# Patient Record
Sex: MALE | Race: WHITE | NOT HISPANIC OR LATINO | URBAN - METROPOLITAN AREA
[De-identification: names, ages, dates, MRNs, and addresses within clinical notes are randomized per-mention and may not be internally consistent; named-entity substitution may affect disease eponyms.]

---

## 2023-02-27 ENCOUNTER — INPATIENT (INPATIENT)
Facility: HOSPITAL | Age: 53
LOS: 0 days | Discharge: HOME | End: 2023-02-28
Attending: HOSPITALIST | Admitting: HOSPITALIST
Payer: COMMERCIAL

## 2023-02-27 VITALS
RESPIRATION RATE: 16 BRPM | SYSTOLIC BLOOD PRESSURE: 161 MMHG | OXYGEN SATURATION: 100 % | TEMPERATURE: 98 F | HEART RATE: 74 BPM | DIASTOLIC BLOOD PRESSURE: 90 MMHG

## 2023-02-27 DIAGNOSIS — R27.0 ATAXIA, UNSPECIFIED: ICD-10-CM

## 2023-02-27 LAB
ALBUMIN SERPL ELPH-MCNC: 4.7 G/DL — SIGNIFICANT CHANGE UP (ref 3.3–5)
ALP SERPL-CCNC: 74 U/L — SIGNIFICANT CHANGE UP (ref 40–120)
ALT FLD-CCNC: 19 U/L — SIGNIFICANT CHANGE UP (ref 4–41)
ANION GAP SERPL CALC-SCNC: 9 MMOL/L — SIGNIFICANT CHANGE UP (ref 7–14)
APPEARANCE UR: CLEAR — SIGNIFICANT CHANGE UP
APTT BLD: 27.1 SEC — SIGNIFICANT CHANGE UP (ref 27–36.3)
AST SERPL-CCNC: 17 U/L — SIGNIFICANT CHANGE UP (ref 4–40)
BILIRUB SERPL-MCNC: 0.2 MG/DL — SIGNIFICANT CHANGE UP (ref 0.2–1.2)
BILIRUB UR-MCNC: NEGATIVE — SIGNIFICANT CHANGE UP
BUN SERPL-MCNC: 18 MG/DL — SIGNIFICANT CHANGE UP (ref 7–23)
CALCIUM SERPL-MCNC: 9.3 MG/DL — SIGNIFICANT CHANGE UP (ref 8.4–10.5)
CARBAMAZEPINE SERPL-MCNC: 16.8 UG/ML — CRITICAL HIGH (ref 4–12)
CHLORIDE SERPL-SCNC: 101 MMOL/L — SIGNIFICANT CHANGE UP (ref 98–107)
CO2 SERPL-SCNC: 26 MMOL/L — SIGNIFICANT CHANGE UP (ref 22–31)
COLOR SPEC: SIGNIFICANT CHANGE UP
CREAT SERPL-MCNC: 0.61 MG/DL — SIGNIFICANT CHANGE UP (ref 0.5–1.3)
DIFF PNL FLD: NEGATIVE — SIGNIFICANT CHANGE UP
EGFR: 116 ML/MIN/1.73M2 — SIGNIFICANT CHANGE UP
FLUAV AG NPH QL: SIGNIFICANT CHANGE UP
FLUBV AG NPH QL: SIGNIFICANT CHANGE UP
GLUCOSE SERPL-MCNC: 89 MG/DL — SIGNIFICANT CHANGE UP (ref 70–99)
GLUCOSE UR QL: NEGATIVE — SIGNIFICANT CHANGE UP
HCT VFR BLD CALC: 44 % — SIGNIFICANT CHANGE UP (ref 39–50)
HGB BLD-MCNC: 14.4 G/DL — SIGNIFICANT CHANGE UP (ref 13–17)
INR BLD: 1.12 RATIO — SIGNIFICANT CHANGE UP (ref 0.88–1.16)
KETONES UR-MCNC: NEGATIVE — SIGNIFICANT CHANGE UP
LEUKOCYTE ESTERASE UR-ACNC: NEGATIVE — SIGNIFICANT CHANGE UP
MAGNESIUM SERPL-MCNC: 2.2 MG/DL — SIGNIFICANT CHANGE UP (ref 1.6–2.6)
MCHC RBC-ENTMCNC: 28.6 PG — SIGNIFICANT CHANGE UP (ref 27–34)
MCHC RBC-ENTMCNC: 32.7 GM/DL — SIGNIFICANT CHANGE UP (ref 32–36)
MCV RBC AUTO: 87.5 FL — SIGNIFICANT CHANGE UP (ref 80–100)
NITRITE UR-MCNC: NEGATIVE — SIGNIFICANT CHANGE UP
NRBC # BLD: 0 /100 WBCS — SIGNIFICANT CHANGE UP (ref 0–0)
NRBC # FLD: 0 K/UL — SIGNIFICANT CHANGE UP (ref 0–0)
PH UR: 6 — SIGNIFICANT CHANGE UP (ref 5–8)
PHOSPHATE SERPL-MCNC: 2.7 MG/DL — SIGNIFICANT CHANGE UP (ref 2.5–4.5)
PLATELET # BLD AUTO: 190 K/UL — SIGNIFICANT CHANGE UP (ref 150–400)
POTASSIUM SERPL-MCNC: 4.5 MMOL/L — SIGNIFICANT CHANGE UP (ref 3.5–5.3)
POTASSIUM SERPL-SCNC: 4.5 MMOL/L — SIGNIFICANT CHANGE UP (ref 3.5–5.3)
PROT SERPL-MCNC: 7.9 G/DL — SIGNIFICANT CHANGE UP (ref 6–8.3)
PROT UR-MCNC: ABNORMAL
PROTHROM AB SERPL-ACNC: 13 SEC — SIGNIFICANT CHANGE UP (ref 10.5–13.4)
RBC # BLD: 5.03 M/UL — SIGNIFICANT CHANGE UP (ref 4.2–5.8)
RBC # FLD: 13.4 % — SIGNIFICANT CHANGE UP (ref 10.3–14.5)
RSV RNA NPH QL NAA+NON-PROBE: SIGNIFICANT CHANGE UP
SARS-COV-2 RNA SPEC QL NAA+PROBE: SIGNIFICANT CHANGE UP
SODIUM SERPL-SCNC: 136 MMOL/L — SIGNIFICANT CHANGE UP (ref 135–145)
SP GR SPEC: 1.02 — SIGNIFICANT CHANGE UP (ref 1.01–1.05)
UROBILINOGEN FLD QL: SIGNIFICANT CHANGE UP
WBC # BLD: 5.3 K/UL — SIGNIFICANT CHANGE UP (ref 3.8–10.5)
WBC # FLD AUTO: 5.3 K/UL — SIGNIFICANT CHANGE UP (ref 3.8–10.5)

## 2023-02-27 PROCEDURE — 70496 CT ANGIOGRAPHY HEAD: CPT | Mod: 26,MA

## 2023-02-27 PROCEDURE — 70498 CT ANGIOGRAPHY NECK: CPT | Mod: 26,MA

## 2023-02-27 PROCEDURE — 99285 EMERGENCY DEPT VISIT HI MDM: CPT

## 2023-02-27 PROCEDURE — 71046 X-RAY EXAM CHEST 2 VIEWS: CPT | Mod: 26

## 2023-02-27 RX ORDER — LEVETIRACETAM 250 MG/1
1500 TABLET, FILM COATED ORAL ONCE
Refills: 0 | Status: DISCONTINUED | OUTPATIENT
Start: 2023-02-27 | End: 2023-02-28

## 2023-02-27 NOTE — ED PROVIDER NOTE - PROGRESS NOTE DETAILS
[facility] PGY1 - signout - 52-year-old male with a history of epilepsy, hypertension presents with hypotension, weakness, syncope, multiple falls over the last week.  Concern for elevated carbamazepine levels causing symptoms.  Carbamazepine level 16.8 here.  Pending neuro recommendations likely admission to hospitalist.

## 2023-02-27 NOTE — ED ADULT TRIAGE NOTE - CHIEF COMPLAINT QUOTE
Pt. states he had episodes of low blood pressure, weakness and fall last week. His BP meds were adjusted by MD. Today pt states he had more weakness, elevated BP and fall again. Pmhx: epilepsy, HTN

## 2023-02-27 NOTE — ED PROVIDER NOTE - ATTENDING CONTRIBUTION TO CARE
I (Gilson) agree with above, I performed a history and physical. Counseled richie medical staff, physician assistant, and/or medical student on medical decision making as documented. Medical decisions and treatment interventions were made in real time during the patient encounter. Additionally and/or with the following exceptions: Patient is a 52-year-old male past medical history of hypertension and epilepsy who is presenting to the emergency department with worsening falls and weakness for 2 weeks.  He fell earlier today at work did not hit his head.  Vital signs are noted to be normal except for hypertension, patient was initially ambulatory but had ataxia and nearly fell into the exam room.  He had 5 out of 5 strength in all extremities.  CBC was within normal limits, coagulation studies normal, CMP within normal limits, UA negative for UTI, carbamazepine level elevated to 16.8.  CT angiography was also performed to rule out central etiology.  CT angiography of the head and neck was negative for acute stenosis.  Suspect carbamazepine toxicity.  Especially in light that patient may or may not have taken his medication incorrectly or may have taken an extra dose.  Neurology was consulted for recs on dose adjustment.  Patient was amenable to admission until resolution of ataxia.

## 2023-02-27 NOTE — ED ADULT NURSE NOTE - OBJECTIVE STATEMENT
pt. received to room 24 A&Ox4 ambulatory pmh of epilepsy p/w multiple complaints. pt. endorses x2 weeks of "high and low blood pressure" highest being 160/100 at home, pt. is unsure of what lowest was. pt. endorsing blurry vision, loss of balance in the LE, and dizziness at this time. pt. endorses a feeling of urgency when urinating, denies GI changes. Pupils PERRLA at this time, no neurological deficits noted. NAD noted at this time. respirations even and unlabored on RA. NSR on cardiac monitor. MD Chamberlain at bedside for eval. comfort measures provided. safety precautions maintained.

## 2023-02-27 NOTE — PROVIDER CONTACT NOTE (OTHER) - BACKGROUND
[facility]orative Specialist Mobility Note       Activity: Ambulate in caballero, Chair     Assistive Device: Front wheel walker patient admitted for ataxia

## 2023-02-27 NOTE — PATIENT PROFILE ADULT - FALL HARM RISK - RISK INTERVENTIONS
Communicate Fall Risk and Risk Factors to all staff, patient, and family/Reinforce activity limits and safety measures with patient and family/Sit up slowly, dangle for a short time, stand at bedside before walking/Visual Cue: Yellow wristband/Bed in lowest position, wheels locked, appropriate side rails in place/Call bell, personal items and telephone in reach/Instruct patient to call for assistance before getting out of bed or chair/Non-slip footwear when patient is out of bed/[place] to call system/Physically safe environment - no spills, clutter or unnecessary equipment/Purposeful Proactive Rounding/Room/bathroom lighting operational, light cord in reach

## 2023-02-27 NOTE — CONSULT NOTE ADULT - ATTENDING COMMENTS
Re-evaluation this AM.  He has no dizziness now - feels back to normal.  No symptoms now.     Exam:  Gait: Normal heel toe, tandem.     Carbamazepine Level, Serum: 10.2 ug/mL (02.28.23 @ 07:03)   Carbamazepine Level, Serum: 16.8:  (02.27.23 @ 16:40)     Spoke with family at bedside along with outpatient Neurologist Dr. Randolph Bucio. Patient usually averages CBZ levels between 7-9 with sometimes a high of 11.     A/P  Mr. Matute is a 52 o man with a h/o epilepsy now with carbamazepine toxicity.  Now asymptomatic with normal levels.    Restart carbamazepine at 200mg q6 at 12PM   No neurological contraindication for discharge.  Follow up with Dr. Randolph Bucio, his outpatient Neurologist    D/W patient, family and Dr. Bucio.   Thank you Re-evaluation this AM.  He has no dizziness now - feels back to normal.  No symptoms now.     Exam:  No ataxia - FNF, TERRIE, HKS.   Gait: Normal heel toe, tandem.     Carbamazepine Level, Serum: 10.2 ug/mL (02.28.23 @ 07:03)   Carbamazepine Level, Serum: 16.8:  (02.27.23 @ 16:40)     Spoke with family at bedside along with outpatient Neurologist Dr. Randolph Bucio. Patient usually averages CBZ levels between 7-9 with sometimes a high of 11.     A/P  Mr. Matute is a 52 o man with a h/o epilepsy now with carbamazepine toxicity.  Now asymptomatic with normal levels.    Restart carbamazepine at 200mg q6 at 12PM   No neurological contraindication for discharge.  Follow up with Dr. Randolph Bucio, his outpatient Neurologist    D/W patient, family and Dr. Bucio.   Thank you

## 2023-02-27 NOTE — CONSULT NOTE ADULT - ASSESSMENT
***  VS on presentation notable for /90. Exam without localizing neurological deficits. CBC, CMP wnl. UA (-). Carbamazepine level 16.8. CTH w/o and CTA H/N w/ unremarkable.       Recommendations:   [] Cardiac markers, CK, lactate  [] Recheck carbamazepine level   [] EKG, Orthostatic vital signs   [] Rest of syncope workup as per primary team  [] Per discussion with ED provider, patient's PM carbamazepine will be held tonight  [] Would call patient's neurologist, Dr. Randolph Bucio, for further recommendations regarding carbamazepine dosing  [] Neurochecks/VS per unit protocol  [] Fall precautions  [] Outpatient follow-up with his neurologist, Dr. Randolph Bucio   ***  VS on presentation notable for /90. Exam without localizing neurological deficits. CBC, CMP wnl. UA (-). Carbamazepine level 16.8. CTH w/o and CTA H/N w/ unremarkable.       Recommendations:   [] Cardiac markers, CK, lactate  [] Recheck carbamazepine level   [] EKG, Orthostatic vital signs   [] Rest of syncope workup as per primary team  [] Per patient's neurologist, Dr. Randolph Bucio, patient's PM carbamazepine will be held tonight  [] Further recommendations regarding carbamazepine dosing per patient's neurologist  [] Neurochecks/VS per unit protocol  [] Fall precautions  [] Outpatient follow-up with his neurologist, Dr. Randolph Bucio   ***  VS on presentation notable for /90. Exam without localizing neurological deficits. CBC, CMP wnl. UA (-). Carbamazepine level 16.8. CTH w/o and CTA H/N w/ unremarkable.       Recommendations:   [] Cardiac markers, CK, lactate  [] Discussed with patient's neurologist, Dr. Randolph Bucio: hold PM carbamazepine tonight  [] Recheck carbamazepine level in the morning  [] Depending on level, will consider carbamazepine 200mg in AM (half the usual dose), but will confirm this with primary team after labs result and discussion with patient's neurologist  [] Continue home keppra (1000mg qAM and 1500mg qPM)  [] EKG, Orthostatic vital signs   [] Neurochecks/VS per unit protocol  [] Fall precautions  [] Outpatient follow-up with his neurologist, Dr. Randolph Bucio   Mr. Matute is a 52 year-old right-handed man with a PMH of HTN and epilepsy (on carbamazepine 400mg BID and levetiracetam 1000mg qAM/1500mg qPM) who presented to the ED from home on 2/27/23 with c/o dizziness described as lightheadedness and falls without loss of consciousness, for which Neurology was consulted. VS on presentation notable for /90, however patient reported he did not take his BP medications prior to presentation. Exam without localizing neurological deficits. CBC, CMP wnl. UA (-). Carbamazepine level 16.8. CTH w/o and CTA H/N w/ unremarkable.     Impression: Transient dizziness/lightheadedness likely due to diffuse cerebral dysfunction possibly secondary to carbamazepine toxicity vs presyncope due to underlying cardiac/metabolic etiology.      Recommendations:   [] Cardiac markers, CK, lactate  [] Discussed with patient's neurologist, Dr. Randolph Bucio: hold PM carbamazepine tonight  [] Recheck carbamazepine level in the morning  [] Depending on level, will consider carbamazepine 200mg in AM (half the usual dose), but will confirm this with primary team after labs result and discussion with patient's neurologist  [] Continue home keppra (1000mg qAM and 1500mg qPM)  [] EKG, Orthostatic vital signs   [] Neurochecks/VS per unit protocol  [] Fall precautions  [] Outpatient follow-up with his neurologist, Dr. Randolph Bucio for further ASM titration      Discussed with patient's neurologist, Dr. Randolph Bucio.  To be formally staffed with Neurology attending, Dr. Bell, in AM.

## 2023-02-27 NOTE — ED PROVIDER NOTE - NS ED ROS FT
CONSTITUTIONAL: + weakness, no fevers or chills  EYES/ENT: + blurry vision  NECK: No pain or stiffness  RESPIRATORY: No cough, wheezing, or shortness of breath  CARDIOVASCULAR: No chest pain or palpitations  GASTROINTESTINAL: No abdominal pain, nausea, vomiting, diarrhea or constipation  GENITOURINARY: No dysuria, + frequency   NEUROLOGICAL: No numbness, + headache, dizziness  MUSCULOSKELETAL: No joint pain, no swelling, no back pain  SKIN: No itching, rashes

## 2023-02-27 NOTE — ED PROVIDER NOTE - OBJECTIVE STATEMENT
52m pmh of epilepsy and htn presenting to the ED for weakness and falls x 2 weeks. Pt endorses low blood pressure at home with 3 falls (no syncope) last week. States last week his PCP recommended taking half his amlodipine every other day, however, pt misunderstood and cut dose in half daily. Notes that today he felt more weak, had high /100, experienced dizziness with blurry vision and fell while at work today without hitting his head or lose consciousness. Also reports difficulty ambulating starting 3 days ago and endorses a feeling of urgency when urinating. Denies N/V/D, fever, sob, numbness, tingling, chest pain, or back pain. 52m pmh of epilepsy and htn presenting to the ED for weakness and falls x 2 weeks. Pt endorses low blood pressure at home with 3 falls (no syncope) last week. States last week his PCP recommended taking half his amlodipine every other day, however, pt misunderstood and cut dose in half daily. Notes that today he felt more weak, had high /100, experienced dizziness with blurry vision and fell while at work today without hitting his head or lose consciousness. Also reports difficulty ambulating starting 3 days ago and endorses a feeling of urgency when urinating. Denies N/V/D, fever, sob, numbness, tingling, chest pain, or back pain.    PCP: Randolph Bucio

## 2023-02-27 NOTE — ED ADULT NURSE NOTE - NSIMPLEMENTINTERV_GEN_ALL_ED
Implemented All Fall Risk Interventions:  [place] to call system. Call bell, personal items and telephone within reach. Instruct patient to call for assistance. Room bathroom lighting operational. Non-slip footwear when patient is off stretcher. Physically safe environment: no spills, clutter or unnecessary equipment. Stretcher in lowest position, wheels locked, appropriate side rails in place. Provide visual cue, wrist band, yellow gown, etc. Monitor gait and stability. Monitor for mental status changes and reorient to person, place, and time. Review medications for side effects contributing to fall risk. Reinforce activity limits and safety measures with patient and family.

## 2023-02-27 NOTE — ED PROVIDER NOTE - CLINICAL SUMMARY MEDICAL DECISION MAKING FREE TEXT BOX
52m pmh of epilepsy and htn presenting for weakness and falls after PCP adjusted amlodipine dose. Pt with /100, dizziness, blurry vision and fall today without LOC. Denies N/V/D, fever, sob, numbness, tingling, chest pain, or back pain. Will order labs and reassess. 52m pmh of epilepsy and htn presenting for weakness and falls after PCP adjusted amlodipine dose. Pt with /100, dizziness, blurry vision and fall today without LOC. Denies N/V/D, fever, sob, numbness, tingling, chest pain, or back pain. Will order labs and reassess.      ===================================  attending mdm (Valdez Riggins MD; attending emergency medicine and medical toxicology)    Patient is a 52-year-old male past medical history of hypertension and epilepsy who is presenting to the emergency department with worsening falls and weakness for 2 weeks.  He fell earlier today at work did not hit his head.  Vital signs are noted to be normal except for hypertension, patient was initially ambulatory but had ataxia and nearly fell into the exam room.  He had 5 out of 5 strength in all extremities.  CBC was within normal limits, coagulation studies normal, CMP within normal limits, UA negative for UTI, carbamazepine level elevated to 16.8.  CT angiography was also performed to rule out central etiology.  CT angiography of the head and neck was negative for acute stenosis.  Suspect carbamazepine toxicity.  Especially in light that patient may or may not have taken his medication incorrectly or may have taken an extra dose.  Neurology was consulted for recs on dose adjustment.  Patient was amenable to admission until resolution of ataxia.

## 2023-02-27 NOTE — ED PROVIDER NOTE - PHYSICAL EXAMINATION
CONSTITUTIONAL: NAD  HEAD: normocephalic, atraumatic  EYES: PERRLA, conjunctiva and sclera clear  ENMT: Moist oral mucosa  RESPIRATORY: Normal respiratory effort; lungs are clear to auscultation bilaterally  CARDIOVASCULAR: Regular rate and rhythm, normal S1 and S2, no lower extremity edema  ABDOMEN: Nontender to palpation, positive bowel sounds, no rebound/guarding  MUSCULOSKELETAL: No joint swelling or tenderness to palpation, no cyanosis or edema  NEUROLOGY: Alert, oriented, CN 2-12 intact and symmetric  SKIN: No rashes, warm, dry

## 2023-02-27 NOTE — CONSULT NOTE ADULT - SUBJECTIVE AND OBJECTIVE BOX
Neurology Consult    SANTOS GRANT  52y Male  MRN-[id number]      HPI:  ***      A 10-system ROS was performed and is negative except as noted above and/or in the HPI.      PAST MEDICAL & SURGICAL HISTORY:  Hypertension      No significant past surgical history      FAMILY HISTORY:  FH: hypertension        SOCIAL HISTORY:   Occupation:         MEDICATIONS    Home Medications:  Amlodipine  Lisinopril  Levetiracetam  Carbamazepine    MEDICATIONS  (STANDING):    MEDICATIONS  (PRN):      Allergies  No Known Allergies        VITALS & EXAMINATION    Vital Signs Last 24 Hrs  T(C): 37.2 (2023 20:19), Max: 37.2 (2023 20:19)  T(F): 98.9 (2023 20:19), Max: 98.9 (2023 20:19)  HR: 66 (2023 20:19) (65 - 74)  BP: 165/104 (2023 20:19) (154/97 - 165/104)  RR: 16 (2023 20:19) (16 - 16)  SpO2: 100% (2023 20:19) (100% - 100%)    Parameters below as of 2023 20:19  Patient On (Oxygen Delivery Method): room air    General:  Constitutional: M, appears stated age, in no apparent distress including pain.  Head: Normocephalic & atraumatic.  Respiratory: Breathing comfortably on room air.    Neurological (>12):  MS: Eyes open, alert, oriented to person, place, situation, time. Normal affect. Follows all commands.    Speech/Language: Clear, fluent with good repetition. Naming intact.    CNs: PERRL. CVF intact OU. EOMI, no nystagmus, no diplopia. V1-3 intact to LT, well developed masseter muscles b/l. No facial asymmetry b/l, full eye closure strength b/l. Hearing grossly intact to conversation. Symmetric palate elevation in midline, no uvula deviation. Gag reflex deferred. Head turning intact b/l. Tongue midline on protrusion.    Motor: Muscle bulk and tone are normal. There is no pronator drift.    RUE/LUE: 5/5 shoulder abductors, elbow flexors/extensors, wrist flexors/extensors, finger .      RLE/LLE: 5/5 hip flexors, knee flexors/extensors, ankle dorsiflexors and plantarflexors.     Sensation: Intact to LT b/l throughout. Normal Romberg.     Coordination: Fast finger tapping with normal amplitude and speed. No dysmetria to FTN/HTS b/l.    Gait: No postural instability. Normal base and natural gait. Able to tandem gait.         LABS:    CBC                       14.4   5.30  )-----------( 190      ( 2023 16:33 )             44.0     Chem     136  |  101  |  18  ----------------------------<  89  4.5   |  26  |  0.61    Ca    9.3      2023 16:33  Phos  2.7       Mg     2.20         TPro  7.9  /  Alb  4.7  /  TBili  0.2  /  DBili  x   /  AST  17  /  ALT  19  /  AlkPhos  74      Coags PT/INR - ( 2023 16:40 )   PT: 13.0 sec;   INR: 1.12 ratio         PTT - ( 2023 16:40 )  PTT:27.1 sec  LFTs LIVER FUNCTIONS - ( 2023 16:33 )  Alb: 4.7 g/dL / Pro: 7.9 g/dL / ALK PHOS: 74 U/L / ALT: 19 U/L / AST: 17 U/L / GGT: x           Lipid Panel   A1c   Cardiac enzymes     U/A Urinalysis Basic - ( 2023 18:53 )  Color: Light Yellow / Appearance: Clear / S.022 / pH: x  Gluc: x / Ketone: Negative  / Bili: Negative / Urobili: <2 mg/dL   Blood: x / Protein: Trace / Nitrite: Negative   Leuk Esterase: Negative / RBC: x / WBC x   Sq Epi: x / Non Sq Epi: x / Bacteria: x        STUDIES & IMAGING    CT Head w/o, CT Angio Head/Neck w/ IV Cont (23 @ 18:50)    CT brain: No acute intracranial hemorrhage, mass effect, midline shift.    CTA neck and brain: No vessel occlusion or hemodynamically significant   stenosis.    Postcontrast CT brain: No areas of abnormal enhancement.     Neurology Consult    SANTOS MATUTE  52y Male  MRN-[id number]      HPI:  Mr. Matute is a 52 year-old right-handed man with a PMH of HTN and epilepsy (on carbamazepine 400mg BID and levetiracetam 1000mg qAM/1500mg qPM) who presented to the ED from home on 23 with c/o dizziness and falls, for which Neurology was consulted. Patient reports a fall without loss of consciousness on Monday, 1 week prior to presentation. At that time his SBP was noted to be 90, which is much lower than baseline. He reports feeling "lightheaded" and states he lowered himself to the floor. There was no head trauma. He states that at that time, it was determined that he was taking his amlodipine incorrectly. He had recently been instructed to take alternating days of one 2.5mg tab and half a 2.5mg tab, but may have misunderstood and was only taking half a tab daily. He reports another episode over the weekend, during which it is unclear if his SBP was low. At that time, he discussed with his Neurologist, and bloodwork was sent, including a currently pending carbamazepine level. The reason being that, on Friday, the patient noted his carbamazepine was changed to a tab similar in color to his Keppra, and is unsure if he accidentally took more of the carbamazepine as a result. On the day of presentation, patient states he was standing at work at approximately 08:30 when he suddenly felt lightheaded and faint. States he did not fall or lose consciousness, but alerted his colleagues so he could step away and sit down. BP at the time was reported to be elevated at 160/90. However he admits he did not take any of his antihypertensives this AM. Furthermore he had 3 cups of coffee in a 4 hour period (4am, 6am, 8am) during which he normally only has 2 cups (4am and 8am). He reports blurry vision at the time, but no associated headache, diplopia, dysarthria, tinnitus numbness, tingling chest pain, sob or palpitations. No recent fever, chills or illness. He reports recent intentional 30lb weight loss over the course of 8 weeks through dietary changes.         A 10-system ROS was performed and is negative except as noted above and/or in the HPI.      PAST MEDICAL & SURGICAL HISTORY:  Hypertension      No significant past surgical history      FAMILY HISTORY:  FH: hypertension        SOCIAL HISTORY:   Occupation:         MEDICATIONS    Home Medications:  Amlodipine  Lisinopril  Levetiracetam  Carbamazepine    MEDICATIONS  (STANDING):    MEDICATIONS  (PRN):      Allergies  No Known Allergies        VITALS & EXAMINATION    Vital Signs Last 24 Hrs  T(C): 37.2 (2023 20:19), Max: 37.2 (2023 20:19)  T(F): 98.9 (2023 20:19), Max: 98.9 (2023 20:19)  HR: 66 (2023 20:19) (65 - 74)  BP: 165/104 (2023 20:19) (154/97 - 165/104)  RR: 16 (2023 20:19) (16 - 16)  SpO2: 100% (2023 20:19) (100% - 100%)    Parameters below as of 2023 20:19  Patient On (Oxygen Delivery Method): room air    General:  Constitutional: M, appears stated age, in no apparent distress including pain.  Head: Normocephalic & atraumatic.  Respiratory: Breathing comfortably on room air.    Neurological (>12):  MS: Eyes open, alert, oriented to person, place, situation, time. Normal affect. Follows all commands.    Speech/Language: Clear, fluent with good repetition. Naming intact.    CNs: PERRL. CVF intact OU. EOMI, no nystagmus, no diplopia. V1-3 intact to LT, well developed masseter muscles b/l. No facial asymmetry b/l, full eye closure strength b/l. Hearing grossly intact to conversation. Symmetric palate elevation in midline, no uvula deviation. Gag reflex deferred. Head turning intact b/l. Tongue midline on protrusion.    Motor: Muscle bulk and tone are normal. There is no pronator drift.    RUE/LUE: 5/5 shoulder abductors, elbow flexors/extensors, wrist flexors/extensors, finger .      RLE/LLE: 5/5 hip flexors, knee flexors/extensors, ankle dorsiflexors and plantarflexors.     Sensation: Intact to LT b/l throughout. Normal Romberg.     Coordination: Fast finger tapping with normal amplitude and speed. No dysmetria to FTN/HTS b/l.    Gait: No postural instability. Normal base and natural gait. Able to tandem gait.         LABS:    CBC                       14.4   5.30  )-----------( 190      ( 2023 16:33 )             44.0     Chem     136  |  101  |  18  ----------------------------<  89  4.5   |  26  |  0.61    Ca    9.3      2023 16:33  Phos  2.7       Mg     2.20         TPro  7.9  /  Alb  4.7  /  TBili  0.2  /  DBili  x   /  AST  17  /  ALT  19  /  AlkPhos  74      Coags PT/INR - ( 2023 16:40 )   PT: 13.0 sec;   INR: 1.12 ratio         PTT - ( 2023 16:40 )  PTT:27.1 sec  LFTs LIVER FUNCTIONS - ( 2023 16:33 )  Alb: 4.7 g/dL / Pro: 7.9 g/dL / ALK PHOS: 74 U/L / ALT: 19 U/L / AST: 17 U/L / GGT: x           Lipid Panel   A1c   Cardiac enzymes     U/A Urinalysis Basic - ( 2023 18:53 )  Color: Light Yellow / Appearance: Clear / S.022 / pH: x  Gluc: x / Ketone: Negative  / Bili: Negative / Urobili: <2 mg/dL   Blood: x / Protein: Trace / Nitrite: Negative   Leuk Esterase: Negative / RBC: x / WBC x   Sq Epi: x / Non Sq Epi: x / Bacteria: x        STUDIES & IMAGING    CT Head w/o, CT Angio Head/Neck w/ IV Cont (23 @ 18:50)    CT brain: No acute intracranial hemorrhage, mass effect, midline shift.    CTA neck and brain: No vessel occlusion or hemodynamically significant   stenosis.    Postcontrast CT brain: No areas of abnormal enhancement.

## 2023-02-27 NOTE — PROVIDER CONTACT NOTE (OTHER) - ASSESSMENT
patient arrived to unit from the ER, denies dizziness, pain or discomfortment. bp 169/108 to left arm, electronically. patient stated " last took my blood pressure medicine was Sunday morning"

## 2023-02-27 NOTE — ED ADULT NURSE REASSESSMENT NOTE - NS ED NURSE REASSESS COMMENT FT1
pt. remains A&Ox4, awake and resting. pt. offers no new complaints at this time. no acute distress noted. respirations even and unlabored. NSR on cardiac monitor. VS as noted. Admitting resident at bedside for interview and aware of BP.
Break RN: A&OX4. Verbalizes feeling good. Denies CP, SOB, nausea, vomiting headache, lightheadedness, vision changes, dizziness, fever or chills. Resp even and unlabored. No acute distress noted. Pt appears comfortable in stretcher, HOB elevated. NSR on bedside cardiac monitor. Speaking in full and complete sentences. Bed in lowest position, call bell in reach, wheels locked, safety maintained. Awaiting further orders.

## 2023-02-28 ENCOUNTER — TRANSCRIPTION ENCOUNTER (OUTPATIENT)
Age: 53
End: 2023-02-28

## 2023-02-28 VITALS
RESPIRATION RATE: 18 BRPM | HEART RATE: 65 BPM | OXYGEN SATURATION: 100 % | TEMPERATURE: 98 F | SYSTOLIC BLOOD PRESSURE: 132 MMHG | DIASTOLIC BLOOD PRESSURE: 72 MMHG

## 2023-02-28 DIAGNOSIS — R42 DIZZINESS AND GIDDINESS: ICD-10-CM

## 2023-02-28 DIAGNOSIS — I10 ESSENTIAL (PRIMARY) HYPERTENSION: ICD-10-CM

## 2023-02-28 DIAGNOSIS — R55 SYNCOPE AND COLLAPSE: ICD-10-CM

## 2023-02-28 DIAGNOSIS — Z29.9 ENCOUNTER FOR PROPHYLACTIC MEASURES, UNSPECIFIED: ICD-10-CM

## 2023-02-28 DIAGNOSIS — G40.909 EPILEPSY, UNSPECIFIED, NOT INTRACTABLE, WITHOUT STATUS EPILEPTICUS: ICD-10-CM

## 2023-02-28 LAB
ANION GAP SERPL CALC-SCNC: 10 MMOL/L — SIGNIFICANT CHANGE UP (ref 7–14)
BUN SERPL-MCNC: 12 MG/DL — SIGNIFICANT CHANGE UP (ref 7–23)
CALCIUM SERPL-MCNC: 9.4 MG/DL — SIGNIFICANT CHANGE UP (ref 8.4–10.5)
CARBAMAZEPINE SERPL-MCNC: 10.2 UG/ML — SIGNIFICANT CHANGE UP (ref 4–12)
CHLORIDE SERPL-SCNC: 102 MMOL/L — SIGNIFICANT CHANGE UP (ref 98–107)
CK SERPL-CCNC: 145 U/L — SIGNIFICANT CHANGE UP (ref 30–200)
CO2 SERPL-SCNC: 25 MMOL/L — SIGNIFICANT CHANGE UP (ref 22–31)
CREAT SERPL-MCNC: 0.64 MG/DL — SIGNIFICANT CHANGE UP (ref 0.5–1.3)
EGFR: 114 ML/MIN/1.73M2 — SIGNIFICANT CHANGE UP
GLUCOSE SERPL-MCNC: 105 MG/DL — HIGH (ref 70–99)
HCT VFR BLD CALC: 41.5 % — SIGNIFICANT CHANGE UP (ref 39–50)
HGB BLD-MCNC: 13.8 G/DL — SIGNIFICANT CHANGE UP (ref 13–17)
LACTATE SERPL-SCNC: 1.1 MMOL/L — SIGNIFICANT CHANGE UP (ref 0.5–2)
MAGNESIUM SERPL-MCNC: 2 MG/DL — SIGNIFICANT CHANGE UP (ref 1.6–2.6)
MCHC RBC-ENTMCNC: 28.1 PG — SIGNIFICANT CHANGE UP (ref 27–34)
MCHC RBC-ENTMCNC: 33.3 GM/DL — SIGNIFICANT CHANGE UP (ref 32–36)
MCV RBC AUTO: 84.5 FL — SIGNIFICANT CHANGE UP (ref 80–100)
NRBC # BLD: 0 /100 WBCS — SIGNIFICANT CHANGE UP (ref 0–0)
NRBC # FLD: 0 K/UL — SIGNIFICANT CHANGE UP (ref 0–0)
PHOSPHATE SERPL-MCNC: 2.9 MG/DL — SIGNIFICANT CHANGE UP (ref 2.5–4.5)
PLATELET # BLD AUTO: 173 K/UL — SIGNIFICANT CHANGE UP (ref 150–400)
POTASSIUM SERPL-MCNC: 4.1 MMOL/L — SIGNIFICANT CHANGE UP (ref 3.5–5.3)
POTASSIUM SERPL-SCNC: 4.1 MMOL/L — SIGNIFICANT CHANGE UP (ref 3.5–5.3)
RBC # BLD: 4.91 M/UL — SIGNIFICANT CHANGE UP (ref 4.2–5.8)
RBC # FLD: 13.3 % — SIGNIFICANT CHANGE UP (ref 10.3–14.5)
SODIUM SERPL-SCNC: 137 MMOL/L — SIGNIFICANT CHANGE UP (ref 135–145)
TROPONIN T, HIGH SENSITIVITY RESULT: 8 NG/L — SIGNIFICANT CHANGE UP
WBC # BLD: 5.51 K/UL — SIGNIFICANT CHANGE UP (ref 3.8–10.5)
WBC # FLD AUTO: 5.51 K/UL — SIGNIFICANT CHANGE UP (ref 3.8–10.5)

## 2023-02-28 PROCEDURE — 99223 1ST HOSP IP/OBS HIGH 75: CPT | Mod: GC

## 2023-02-28 PROCEDURE — 12345: CPT | Mod: NC

## 2023-02-28 PROCEDURE — 99222 1ST HOSP IP/OBS MODERATE 55: CPT

## 2023-02-28 RX ORDER — CARBAMAZEPINE 200 MG
1 TABLET ORAL
Qty: 120 | Refills: 0
Start: 2023-02-28 | End: 2023-03-29

## 2023-02-28 RX ORDER — LEVETIRACETAM 250 MG/1
1500 TABLET, FILM COATED ORAL
Refills: 0 | Status: DISCONTINUED | OUTPATIENT
Start: 2023-02-28 | End: 2023-02-28

## 2023-02-28 RX ORDER — CARBAMAZEPINE 200 MG
1.5 TABLET ORAL
Qty: 120 | Refills: 0 | DISCHARGE
Start: 2023-02-28 | End: 2023-03-29

## 2023-02-28 RX ORDER — CARBAMAZEPINE 200 MG
200 TABLET ORAL EVERY 6 HOURS
Refills: 0 | Status: DISCONTINUED | OUTPATIENT
Start: 2023-02-28 | End: 2023-02-28

## 2023-02-28 RX ORDER — AMLODIPINE BESYLATE 2.5 MG/1
2.5 TABLET ORAL DAILY
Refills: 0 | Status: DISCONTINUED | OUTPATIENT
Start: 2023-02-28 | End: 2023-02-28

## 2023-02-28 RX ORDER — LISINOPRIL 2.5 MG/1
40 TABLET ORAL DAILY
Refills: 0 | Status: DISCONTINUED | OUTPATIENT
Start: 2023-02-28 | End: 2023-02-28

## 2023-02-28 RX ORDER — LEVETIRACETAM 250 MG/1
1000 TABLET, FILM COATED ORAL
Refills: 0 | Status: DISCONTINUED | OUTPATIENT
Start: 2023-02-28 | End: 2023-02-28

## 2023-02-28 RX ADMIN — LEVETIRACETAM 1000 MILLIGRAM(S): 250 TABLET, FILM COATED ORAL at 07:59

## 2023-02-28 RX ADMIN — LISINOPRIL 40 MILLIGRAM(S): 2.5 TABLET ORAL at 05:24

## 2023-02-28 RX ADMIN — AMLODIPINE BESYLATE 2.5 MILLIGRAM(S): 2.5 TABLET ORAL at 05:24

## 2023-02-28 RX ADMIN — Medication 200 MILLIGRAM(S): at 13:38

## 2023-02-28 NOTE — H&P ADULT - TIME BILLING
Preparing to see the patient including review of tests and other providers' notes, confirming history with patient, performing medical examination and evaluation, counseling and educating the patient, ordering tests, communicating with other health care professionals, documenting clinical information in the EMR, independently interpreting results and communicating results to the patient, care coordination

## 2023-02-28 NOTE — H&P ADULT - NSHPLABSRESULTS_GEN_ALL_CORE
LABS:                          14.4   5.30  )-----------( 190      ( 2023 16:33 )             44.0         136  |  101  |  18  ----------------------------<  89  4.5   |  26  |  0.61    Ca    9.3      2023 16:33  Phos  2.7       Mg     2.20         TPro  7.9  /  Alb  4.7  /  TBili  0.2  /  DBili  x   /  AST  17  /  ALT  19  /  AlkPhos  74      LIVER FUNCTIONS - ( 2023 16:33 )  Alb: 4.7 g/dL / Pro: 7.9 g/dL / ALK PHOS: 74 U/L / ALT: 19 U/L / AST: 17 U/L / GGT: x           PT/INR - ( 2023 16:40 )   PT: 13.0 sec;   INR: 1.12 ratio         PTT - ( 2023 16:40 )  PTT:27.1 sec    Carbamazepine Level: 16.8 (elevated)      Urinalysis Basic - ( 2023 18:53 )    Color: Light Yellow / Appearance: Clear / S.022 / pH: x  Gluc: x / Ketone: Negative  / Bili: Negative / Urobili: <2 mg/dL   Blood: x / Protein: Trace / Nitrite: Negative   Leuk Esterase: Negative / RBC: x / WBC x   Sq Epi: x / Non Sq Epi: x / Bacteria: x      IMAGING:    CXR  IMPRESSION:  No acute traumatic sequela.    CT angio Head and Neck:  IMPRESSION:    CT brain: No acute intracranial hemorrhage, mass effect, midline shift.    CTA neck and brain: No vessel occlusion or hemodynamically significant   stenosis.    Postcontrast CT brain: No areas of abnormal enhancement.      EKG: NSR, QTc 421 LABS:                          14.4   5.30  )-----------( 190      ( 2023 16:33 )             44.0         136  |  101  |  18  ----------------------------<  89  4.5   |  26  |  0.61    Ca    9.3      2023 16:33  Phos  2.7       Mg     2.20         TPro  7.9  /  Alb  4.7  /  TBili  0.2  /  DBili  x   /  AST  17  /  ALT  19  /  AlkPhos  74      PT/INR - ( 2023 16:40 )   PT: 13.0 sec;   INR: 1.12 ratio    PTT - ( 2023 16:40 )  PTT:27.1 sec    Carbamazepine Level: 16.8 (elevated)    Urinalysis Basic - ( 2023 18:53 )  Color: Light Yellow / Appearance: Clear / S.022 / pH: x  Gluc: x / Ketone: Negative  / Bili: Negative / Urobili: <2 mg/dL   Blood: x / Protein: Trace / Nitrite: Negative   Leuk Esterase: Negative / RBC: x / WBC x   Sq Epi: x / Non Sq Epi: x / Bacteria: x    IMAGING:  < from: CT Angio Head w/ IV Cont (23 @ 18:50) >/< from: CT Angio Neck w/ IV Cont (23 @ 18:50) >  CT BRAIN:  PARENCHYMA: No acute intracranial hemorrhage, mass effect, or midline shift.  VENTRICLES, SULCI AND BASAL CISTERNS:  Normal.  EXTRA-AXIAL:  No abnormal extraaxial collection.  VISUALIZED SINUSES:  Clear.  VISUALIZED MASTOIDS:  Clear.  CALVARIUM:  Intact.  ORBITS: Unremarkable.  MISCELLANEOUS:  None.  CTA NECK:   Thoracic aorta and branch vessels: Patent without flow-limiting stenosis. No evidence of dissection.   Right carotid system: No hemodynamically significant stenosis using NASCET criteria. No evidence of dissection.   Left carotid system: No hemodynamically significant stenosis using NASCET criteria.  No evidence of dissection.   Vertebral arteries: No flow-limiting stenosis.  No evidence of dissection. Dominant left vertebral artery.   Soft tissues of the neck: Unremarkable.  Visualized spine: Unremarkable.  Visualized upper chest: Unremarkable.  CTA BRAIN:  Internal carotid arteries: Patent bilaterally. Atherosclerotic changes near the right carotid bulb and right internal carotid artery origin without flow-limiting stenosis.  Anterior cerebral arteries: Patent bilaterally without flow limiting stenosis.  Middle cerebral arteries: Patent bilaterally without flow limiting  stenosis.  Posterior cerebral arteries: Patent bilaterally without stenosis.   Hypoplastic left P1. Bilateral PCOMs are present.  Vertebrobasilar: Patent without stenosis. Mild prominence of the distal basilar artery at the confluence (3-453).  Dural venous sinuses: Grossly patent.  IMPRESSION:   CT brain: No acute intracranial hemorrhage, mass effect, midline shift.   CTA neck and brain: No vessel occlusion or hemodynamically significant stenosis.  Postcontrast CT brain: No areas of abnormal enhancement.  < end of copied text >    < from: Xray Chest 2 Views PA/Lat (23 @ 17:22) >  The heart is normal in size. The lungs are clear. There is no pneumothorax or pleural effusion.  IMPRESSION: No acute traumatic sequela.  < end of copied text >    EKG personally reviewed and interpreted - NSR LAD, LVH, QTc 421ms

## 2023-02-28 NOTE — DISCHARGE NOTE PROVIDER - NSDCCPCAREPLAN_GEN_ALL_CORE_FT
PRINCIPAL DISCHARGE DIAGNOSIS  Diagnosis: Ataxia  Assessment and Plan of Treatment: You presented with 1 - 2 hours of dizziness and blurry visions. Your carbamazepine levels when you presented to the hospital were very elevated. This was likely due to taking your carbamazepine doses too close together. Our neurologists spoke with your outpatient neurologist and the decision was made to discharge you on carbamazpine immediate release 200 every 6 hours. It is important that this medication be taken on time, as carbamazepine toxicity when mild can cause drowsiness, dizziness, unstable walker however when severe can result in coma, seizure or death. Please follow up as soon as possible with your neurologist to adjust your regimen. And if you have a recurrence of symptoms please return to the hospital or call your neurologist for further guidance.

## 2023-02-28 NOTE — DISCHARGE NOTE PROVIDER - NSDCCPTREATMENT_GEN_ALL_CORE_FT
PRINCIPAL PROCEDURE  Procedure: Brain CT  Findings and Treatment: CT BRAIN:  PARENCHYMA: No acute intracranial hemorrhage, mass effect, or midline   shift.  VENTRICLES, SULCI AND BASAL CISTERNS:  Normal.  EXTRA-AXIAL:  No abnormal extraaxial collection.  VISUALIZED SINUSES:  Clear.  VISUALIZED MASTOIDS:  Clear.  CALVARIUM:  Intact.  ORBITS: Unremarkable.  MISCELLANEOUS:  None.  CTA NECK:  Thoracic aorta and branch vessels: Patent without flow-limiting stenosis.   No evidence of dissection.  Right carotid system: No hemodynamically significant stenosis using   NASCET criteria. No evidence of dissection.  Left carotid system: No hemo  < end of copied text >  dynamically significant stenosis using NASCET   criteria.  No evidence of dissection.  Vertebral arteries: No flow-limiting stenosis.  No evidence of   dissection. Dominant left vertebral artery.  Soft tissues of the neck: Unremarkable.  Visualized spine: Unremarkable.  Visualized upper chest: Unremarkable.  CTA BRAIN:  Internal carotid arteries: Patent bilaterally. Atherosclerotic changes   near the right carotid bulb and right internal carotid artery origin   without flow-limiting stenosis.  Anterior cerebral arteries: Patent bilaterally without flow limiting   stenosis.  Middle cerebral arteries: Patent bilaterally without flow limiting    stenosis.  Posterior cerebral arteries: Patent bilaterally without stenosis.   Hypoplastic left P1. Bilateral PCOMs are present.  Vertebrobasilar: Patent without stenosis. Mild prominence of the distal   basilar artery at the confluence (3-453).  Dural venous sinuses: Grossly patent.  IMPRESSION:  CT brain: No acute intracranial hemorrhage, mass effect, midline shift.  CTA neck and brain: No vessel occlusion or hemodynamically significant   stenosis.  Postcontrast CT brain: No areas of abnormal enhancement.  --- End of Report ---< from: CT Angio Neck w/ IV Cont (02.27.23 @ 18:50) >

## 2023-02-28 NOTE — DISCHARGE NOTE NURSING/CASE MANAGEMENT/SOCIAL WORK - PATIENT PORTAL LINK FT
You can access the FollowMyHealth Patient Portal offered by [facility] by registering at the following website: [email or web address]. By joining [facility]’s FollowMyHealth portal, you will also be able to view your health information using other applications (apps) compatible with our system.

## 2023-02-28 NOTE — H&P ADULT - PROBLEM SELECTOR PLAN 1
Pt p/w dizziness and ataxia on morning of admission after taking his BID carbamazepine 400mg only 6 hours apart, found to have high carbamazepine level (16.8), CTA head/neck wnl, EKG NSR, dizziness possibly due to elevated carbamazepine level  - Neuro consulted in ED, who connected with pt's outpt neurologist Dr. Randolph Bucio   - Plan to recheck carbamazepine level in AM    - Depending on level, can give 200mg carbamazepine   - c/w syncope workup as below

## 2023-02-28 NOTE — CHART NOTE - NSCHARTNOTEFT_GEN_A_CORE
RESULTS  Carbamazepine level AM 10.2 <--- 16.8    Spoke with family at bedside along with outpatient Neurologist Dr. Randolph Bucio. Patient usually averages between 7-9 with sometimes a high of 11.     IMPRESSION   Transient dizziness/lightheadedness likely due to diffuse cerebral dysfunction possibly secondary to carbamazepine toxicity. Now much improved     RECOMMENDATION  [] restart carbamazepine at 200mg q6 at 12PM   [] No neurological contraindication to discharge as patient is otherwise stable  [] Patient should follow up with Dr. Randolph Bucio, his outpatient Neurologist       Plan discussed with Neurology attending, Dr. Mimi Bell RESULTS  Carbamazepine level AM 10.2 <--- 16.8    Spoke with family at bedside along with outpatient Neurologist Dr. Randolph Bucio. Patient usually averages between 7-9 with sometimes a high of 11.     IMPRESSION   Transient dizziness/lightheadedness likely due to diffuse cerebral dysfunction possibly secondary to carbamazepine toxicity. Now much improved     RECOMMENDATION  [] restart carbamazepine at 200mg q6 at 12PM   [] No neurological contraindication to discharge as patient is otherwise stable  [] Patient should follow up with Dr. Randolph Bucio, his outpatient Neurologist       Plan discussed with Neurology attending, Dr. Mimi Bell  Thank you

## 2023-02-28 NOTE — H&P ADULT - PROBLEM SELECTOR PLAN 2
In addition to episode of dizziness, pt reports pre-syncope, blurry vision and falls a/w SBP in the 90s at home (lower than baseline), EKG wnl, CTA head/neck wnl  - Hold home amlodipine  - Check orthostatic BP in AM   - Consider TTE   - c/w management of carbamazepine as above In addition to episode of dizziness, pt reports pre-syncope, blurry vision and falls a/w SBP in the 90s at home (lower than baseline), EKG wnl, CTA head/neck wnl  - Check orthostatic BP in AM   - f/u AM trop, CK and lactate    - Consider TTE   - c/w management of carbamazepine as above

## 2023-02-28 NOTE — H&P ADULT - NSHPREVIEWOFSYSTEMS_GEN_ALL_CORE
REVIEW OF SYSTEMS:  CONSTITUTIONAL: No fever, chills, night sweats, or fatigue  EYES: No eye pain, or discharge. +blurry vision (during episode 1 week ago)  ENMT:  No difficulty hearing, tinnitus, vertigo; No sinus or throat pain  NECK: No pain or stiffness  RESPIRATORY: No cough, wheezing, or hemoptysis; No shortness of breath  CARDIOVASCULAR: No chest pain, palpitations, or leg swelling  GASTROINTESTINAL: No abdominal or epigastric pain. No nausea, vomiting, or hematemesis; No diarrhea or constipation. No melena or hematochezia.  GENITOURINARY: No dysuria, frequency, hematuria, or incontinence  NEUROLOGICAL: No headaches, memory loss, loss of strength, numbness, or tremors. +dizziness on morning of admission  SKIN: No itching, burning, rashes, or lesions   LYMPH NODES: No enlarged glands  ENDOCRINE: No heat or cold intolerance; No hair loss  MUSCULOSKELETAL: No joint pain or swelling; No muscle, back, or extremity pain  PSYCHIATRIC: No depression, anxiety, mood swings, or difficulty sleeping  HEME/LYMPH: No easy bruising, or bleeding gums  ALLERGY AND IMMUNOLOGIC: No hives or eczema REVIEW OF SYSTEMS:  CONSTITUTIONAL: No fever, chills, night sweats, or fatigue  EYES: No eye pain, or discharge. +blurry vision (during episode 1 week ago)  ENMT:  No difficulty hearing, tinnitus, vertigo; No sinus or throat pain  NECK: No pain or stiffness  RESPIRATORY: No cough, wheezing, or hemoptysis; No shortness of breath  CARDIOVASCULAR: No chest pain, palpitations, or leg swelling  GASTROINTESTINAL: No abdominal or epigastric pain. No nausea, vomiting, or hematemesis; No diarrhea or constipation. No melena or hematochezia.  GENITOURINARY: No dysuria, frequency, hematuria, or incontinence  NEUROLOGICAL: No headaches, memory loss, loss of strength, numbness, or tremors. +dizziness on morning of admission; No vertigo   SKIN: No itching, burning, rashes, or lesions   LYMPH NODES: No enlarged glands  ENDOCRINE: No heat or cold intolerance; No hair loss  MUSCULOSKELETAL: No joint pain or swelling; No muscle, back, or extremity pain  PSYCHIATRIC: No depression, anxiety, mood swings, or difficulty sleeping  HEME/LYMPH: No easy bruising, or bleeding gums  ALLERGY AND IMMUNOLOGIC: No hives or eczema

## 2023-02-28 NOTE — H&P ADULT - PROBLEM SELECTOR PLAN 3
Pt with hx of epilepsy on carbamazepine 400mg BID and keppra 1000mg in AM and 1500mg in PM, last breakthrough seizure 3-4 yrs ago. Outpt neurologist is Randolph Bucio  - c/w home keppra  - hold home carbamazepine pending AM level and redose as above

## 2023-02-28 NOTE — PROGRESS NOTE ADULT - PROBLEM SELECTOR PLAN 1
Pt p/w dizziness and ataxia on morning of admission after taking his BID carbamazepine 400mg only 6 hours apart, found to have high carbamazepine level (16.8), CTA head/neck wnl, EKG NSR, dizziness possibly due to elevated carbamazepine level  - Neuro consulted in ED, who connected with pt's outpt neurologist Dr. Randolph Bucio   - Plan to recheck carbamazepine level in AM    - Depending on level, can give 200mg carbamazepine   - c/w syncope workup as below Pt p/w dizziness and ataxia on morning of admission after taking his BID carbamazepine 400mg only 6 hours apart, found to have high carbamazepine level (16.8), CTA head/neck wnl, EKG NSR, dizziness possibly due to elevated carbamazepine level  - Neuro consulted in ED, who connected with pt's outpt neurologist Dr. Randolph Bucio   - carbamazepine AM level wnl  - discharge on 200 mg IR carbamazepine q6h per neurology and outpatient neurologist Dr. Carbajal  - patient agreeable to compliance with plan listed above.

## 2023-02-28 NOTE — H&P ADULT - ATTENDING COMMENTS
52M w/epilepsy (last seizure 4 years ago), HTN, presenting with ataxia, thought to be secondary to elevated carbamazepine level. Spoke with Dr. Priest (tox), neuro recs appreciated, PM med held, repeat level in AM pending. Patient reports has since been able to ambulate without difficulty and dizziness has resolved. BP earlier in week low, outpt neuro recommended amlodipine QOD, last took on Sunday. Monitor BP, check orthostatics. CTH, CTA H/N reviewed. F/u AM carbamazepine level and AM neuro recs. 52M w/epilepsy (last seizure 4 years ago), HTN, presenting with ataxia, thought to be secondary to elevated carbamazepine level. Spoke with Dr. Priest (tox), neuro recs appreciated, PM med held, repeat level in AM pending. Patient reports has since been able to ambulate without difficulty and dizziness has resolved. BP earlier in week low, outpt neuro recommended amlodipine QOD, last took on Sunday, missed BP meds Monday, monitor BP, check orthostatics, consider d/c of amlodipine pending BP today. CTH, CTA H/N reviewed. F/u AM carbamazepine level and AM neuro recs.

## 2023-02-28 NOTE — H&P ADULT - PROBLEM SELECTOR PLAN 4
Pt with hx HTN on amlodipine 2.5mg qD and lisinopril 40mg qD. BP on admission 160s SBP   - hold home amlodipine   - consider restarting home lisinopril in AM Pt with hx HTN on amlodipine 2.5mg qD and lisinopril 40mg qD. BP on admission 160s SBP   - c/w home amlodipine   - c/w home lisinopril

## 2023-02-28 NOTE — PROGRESS NOTE ADULT - PROBLEM SELECTOR PLAN 4
Pt with hx HTN on amlodipine 2.5mg qD and lisinopril 40mg qD. BP on admission 160s SBP   - c/w home amlodipine   - c/w home lisinopril

## 2023-02-28 NOTE — H&P ADULT - ASSESSMENT
52M with PMH epilepsy and HTN p/w dizziness and ataxia, found to have elevated carbamazepine level, sx likely 2/2 supratherapeutic carbamazepine, pending pre-syncope work up.

## 2023-02-28 NOTE — H&P ADULT - NSHPPHYSICALEXAM_GEN_ALL_CORE
VITALS:   T(C): 36.6 (02-27-23 @ 23:15), Max: 37.2 (02-27-23 @ 20:19)  HR: 68 (02-27-23 @ 23:15) (65 - 81)  BP: 162/108 (02-27-23 @ 23:15) (154/97 - 187/99)  RR: 17 (02-27-23 @ 23:15) (16 - 17)  SpO2: 100% (02-27-23 @ 23:15) (100% - 100%)    GENERAL: NAD, lying in bed comfortably  HEAD:  Atraumatic, Normocephalic  EYES: EOMI, PERRLA, conjunctiva and sclera clear  ENT: Moist mucous membranes  NECK: Supple, No JVD  CHEST/LUNG: Clear to auscultation bilaterally; No rales, rhonchi, wheezing, or rubs. Unlabored respirations  HEART: Regular rate and rhythm; No murmurs, rubs, or gallops  ABDOMEN: BSx4; Soft, nontender, nondistended  EXTREMITIES:  2+ Peripheral Pulses, brisk capillary refill. No clubbing, cyanosis, or edema  NERVOUS SYSTEM:  A&Ox3, no focal deficits   SKIN: No rashes or lesions  Psych: Normal speech, normal behavior, normal affect VITALS:   T(C): 36.6 (02-27-23 @ 23:15), Max: 37.2 (02-27-23 @ 20:19)  HR: 68 (02-27-23 @ 23:15) (65 - 81)  BP: 162/108 (02-27-23 @ 23:15) (154/97 - 187/99)  RR: 17 (02-27-23 @ 23:15) (16 - 17)  SpO2: 100% (02-27-23 @ 23:15) (100% - 100%)    GENERAL: NAD, lying in bed comfortably  HEAD:  Atraumatic, Normocephalic  EYES: EOMI, PERRLA, conjunctiva and sclera clear  ENT: Moist mucous membranes  NECK: Supple, No JVD  CHEST/LUNG: Clear to auscultation bilaterally; No rales, rhonchi, wheezing, or rubs. Unlabored respirations  HEART: S1S2 Regular rate and rhythm; No murmurs, rubs, or gallops  ABDOMEN: BSx4; Soft, nontender, nondistended  EXTREMITIES:  2+ Peripheral Pulses, brisk capillary refill. No clubbing, cyanosis, or edema  NERVOUS SYSTEM:  A&Ox3, no focal deficits, CN II-XII intact, FTN intact b/l, strength 5/5 x4 extremities, sensation grossly intact  SKIN: No rashes or lesions  Psych: Normal speech, normal behavior, normal affect

## 2023-02-28 NOTE — PROGRESS NOTE ADULT - SUBJECTIVE AND OBJECTIVE BOX
Patric Kaplan PGY1  pager [phone] or Teams or check resident tab for coverage    Patient is a 52y old  Male who presents with a chief complaint of Dizziness (28 Feb 2023 00:01)    SUBJECTIVE / OVERNIGHT EVENTS:    NAEO.  Patient this morning is,    Brief Daily Plan:    MEDICATIONS  MEDICATIONS  (STANDING):  amLODIPine   Tablet 2.5 milliGRAM(s) Oral daily  levETIRAcetam 1500 milliGRAM(s) Oral once  levETIRAcetam 1000 milliGRAM(s) Oral <User Schedule>  levETIRAcetam 1500 milliGRAM(s) Oral <User Schedule>  lisinopril 40 milliGRAM(s) Oral daily    MEDICATIONS  (PRN):      VITALS  Vital Signs Last 24 Hrs  T(C): 36.6 (27 Feb 2023 23:15), Max: 37.2 (27 Feb 2023 20:19)  T(F): 97.9 (27 Feb 2023 23:15), Max: 98.9 (27 Feb 2023 20:19)  HR: 67 (28 Feb 2023 05:25) (65 - 81)  BP: 129/75 (28 Feb 2023 05:25) (129/75 - 187/99)  BP(mean): 89 (28 Feb 2023 05:25) (89 - 89)  RR: 17 (28 Feb 2023 05:25) (16 - 17)  SpO2: 99% (28 Feb 2023 05:25) (99% - 100%)    Parameters below as of 28 Feb 2023 05:25  Patient On (Oxygen Delivery Method): room air        PHYSICAL EXAM:  GENERAL: no distress  PSYCH: A&O x3  HEAD: Atraumatic, Normocephalic  NECK: Supple, No JVD  CHEST/LUNG: clear to auscultation bilaterally  HEART: regular rate and rhythm, no murmurs  ABDOMEN: nontender to palpation, no rebound tenderness/guarding  EXTREMITIES: no edema on bilateral LE  NEUROLOGY: no focal neurologic deficit  SKIN: No rashes or lesions    LABS:  All labs personally Reviewed.    RADIOLOGY & ADDITIONAL TESTS:  All imaging personally Reviewed.  Patric Kaplan PGY1  pager [phone] or Teams or check resident tab for coverage    Patient is a 52y old  Male who presents with a chief complaint of Dizziness (28 Feb 2023 00:01)    SUBJECTIVE / OVERNIGHT EVENTS:    NAEO.  Patient this morning is feeling well. Denies any headaches, dizziness, blurry vision since episode yesterday.  States he will be compliant with medications going forward.    Brief Daily Plan:  ·	discharge on carbamazpine  mg q6h per neurology, patient agreeable    MEDICATIONS  MEDICATIONS  (STANDING):  amLODIPine   Tablet 2.5 milliGRAM(s) Oral daily  levETIRAcetam 1500 milliGRAM(s) Oral once  levETIRAcetam 1000 milliGRAM(s) Oral <User Schedule>  levETIRAcetam 1500 milliGRAM(s) Oral <User Schedule>  lisinopril 40 milliGRAM(s) Oral daily    MEDICATIONS  (PRN):      VITALS  Vital Signs Last 24 Hrs  T(C): 36.6 (27 Feb 2023 23:15), Max: 37.2 (27 Feb 2023 20:19)  T(F): 97.9 (27 Feb 2023 23:15), Max: 98.9 (27 Feb 2023 20:19)  HR: 67 (28 Feb 2023 05:25) (65 - 81)  BP: 129/75 (28 Feb 2023 05:25) (129/75 - 187/99)  BP(mean): 89 (28 Feb 2023 05:25) (89 - 89)  RR: 17 (28 Feb 2023 05:25) (16 - 17)  SpO2: 99% (28 Feb 2023 05:25) (99% - 100%)    Parameters below as of 28 Feb 2023 05:25  Patient On (Oxygen Delivery Method): room air        PHYSICAL EXAM:  GENERAL: no distress  PSYCH: A&O x3  HEAD: Atraumatic, Normocephalic  NECK: Supple, No JVD  CHEST/LUNG: clear to auscultation bilaterally  HEART: regular rate and rhythm, no murmurs  ABDOMEN: nontender to palpation, no rebound tenderness/guarding  EXTREMITIES: no edema on bilateral LE  NEUROLOGY: no focal neurologic deficit, no nystagmus, PERRL  SKIN: No rashes or lesions    LABS:  All labs personally Reviewed.    RADIOLOGY & ADDITIONAL TESTS:  All imaging personally Reviewed.

## 2023-02-28 NOTE — DISCHARGE NOTE PROVIDER - NSDCMRMEDTOKEN_GEN_ALL_CORE_FT
amLODIPine 2.5 mg oral tablet: 1 tab(s) orally once a day  Epitol 200 mg oral tablet: 1 tab(s) orally every 6 hours  Keppra 1000 mg oral tablet: 1000 milligram(s) orally in the morning (8AM)  1500 milligrams(s) orally in the evening (8PM)   lisinopril 40 mg oral tablet: 1 tab(s) orally once a day

## 2023-02-28 NOTE — PROGRESS NOTE ADULT - PROBLEM SELECTOR PLAN 2
In addition to episode of dizziness, pt reports pre-syncope, blurry vision and falls a/w SBP in the 90s at home (lower than baseline), EKG wnl, CTA head/neck wnl  - Check orthostatic BP in AM   - f/u AM trop, CK and lactate    - Consider TTE   - c/w management of carbamazepine as above In addition to episode of dizziness, pt reports pre-syncope, blurry vision and falls a/w SBP in the 90s at home (lower than baseline), EKG wnl, CTA head/neck wnl  - less likely pre-syncopal. patient with changes for ~2 hrs

## 2023-02-28 NOTE — PROGRESS NOTE ADULT - ASSESSMENT
52M with PMH epilepsy and HTN p/w dizziness and ataxia, found to have elevated carbamazepine level, sx likely 2/2 supratherapeutic carbamazepine, pending pre-syncope work up.  52M with PMH epilepsy and HTN p/w dizziness and ataxia, found to have elevated carbamazepine level, sx likely 2/2 supratherapeutic carbamazepine. Symptoms resolved prior to admission. Patient to be discharged on new regimen until seen by outpatient neurologist.

## 2023-02-28 NOTE — PROGRESS NOTE ADULT - PROBLEM SELECTOR PLAN 3
Pt with hx of epilepsy on carbamazepine 400mg BID and keppra 1000mg in AM and 1500mg in PM, last breakthrough seizure 3-4 yrs ago. Outpt neurologist is Randolph Bucio  - c/w home keppra  - hold home carbamazepine pending AM level and redose as above Pt with hx of epilepsy on carbamazepine 400mg BID and keppra 1000mg in AM and 1500mg in PM, last breakthrough seizure 3-4 yrs ago. Outpt neurologist is Randolph Bucio  - c/w home keppra  - carbamazepine as above

## 2023-02-28 NOTE — DISCHARGE NOTE PROVIDER - HOSPITAL COURSE
Discharge Summary     Admission diagnoses:   dizziness    Discharge diagnoses:   suspected unintentional carbamazepine toxicity    Hospital Course:   For full details, please see H&P, progress notes, consult notes and ancillary notes. Briefly, 52M Luis Fernando Matute, epilepsy (since childhood) on keppra and carbamazepine for decades, last seizure four years prior, presenting with one - two hours of self-limited dizziness, ataxia, blurry vision resolved by time of presentation. He took his night time carbamazepine late and his morning dose early. Carbamazepine levels on admission were 16.8, downtrended to 10.2 in the morning after holding a dose. Neurology was consulted, suspect symptoms in setting of carbamazepine toxicity. Neurology discussed plan with outpatient neurologist Solomon Dickson. Plan is to discharge on carbamazepine immediate release 200 q6h. Patient is agreeable to plan and states he will set an alarm and will have no difficulties with compliance.     On day of discharge, patient is clinically stable with no new exam findings or acute symptoms compared to prior. The patient was seen by the attending physician on the date of discharge and deemed stable and acceptable for discharge. The patient's chronic medical conditions were treated accordingly per the patient's home medication regimen. The patient's medication reconciliation (with changes made to chronic medications), follow up appointments, discharge orders, instructions, and significant lab and diagnostic studies are as noted.     Discharge follow up action items:     1. Follow up with   - Neurologist as soon as possible, preferably in < 1 week  2. Medication changes   - stop carbamazepine ER  - start carbamazepine IR q6h    Patient's ordered code status: Full    Patient disposition: Home     Discharge Summary     Admission diagnoses:   dizziness    Discharge diagnoses:   suspected unintentional carbamazepine toxicity    Hospital Course:   For full details, please see H&P, progress notes, consult notes and ancillary notes. Briefly, 52M Luis Fernando Matute, epilepsy (since childhood) on keppra and carbamazepine for decades, last seizure four years prior, presenting with one - two hours of self-limited dizziness, ataxia, blurry vision resolved by time of presentation. He took his night time carbamazepine late and his morning dose early. Carbamazepine levels on admission were 16.8, downtrended to 10.2 in the morning after holding a dose. CTA brain/neck negative. Neurology was consulted, suspect symptoms in setting of carbamazepine toxicity. Neurology discussed plan with outpatient neurologist Solomon Dickson. Plan is to discharge on carbamazepine immediate release 200 q6h. Patient is agreeable to plan and states he will set an alarm and will have no difficulties with compliance.     On day of discharge, patient is clinically stable with no new exam findings or acute symptoms compared to prior. The patient was seen by the attending physician on the date of discharge and deemed stable and acceptable for discharge. The patient's chronic medical conditions were treated accordingly per the patient's home medication regimen. The patient's medication reconciliation (with changes made to chronic medications), follow up appointments, discharge orders, instructions, and significant lab and diagnostic studies are as noted.     Discharge follow up action items:     1. Follow up with   - Neurologist as soon as possible, preferably in < 1 week  2. Medication changes   - stop carbamazepine ER  - start carbamazepine IR q6h    Patient's ordered code status: Full    Patient disposition: Home     Discharge Summary     Admission diagnoses:   dizziness    Discharge diagnoses:   suspected unintentional carbamazepine toxicity    Hospital Course:   For full details, please see H&P, progress notes, consult notes and ancillary notes. Briefly, 52M Luis Fernando Matute, epilepsy (since childhood) on keppra and carbamazepine for decades, last seizure four years prior, presenting with one - two hours of self-limited dizziness, ataxia, blurry vision resolved by time of presentation. He took his night time carbamazepine late and his morning dose early. Carbamazepine levels on admission were 16.8, downtrended to 10.2 in the morning after holding a dose. CTA brain/neck negative. Neurology was consulted, suspect symptoms in setting of carbamazepine toxicity. Neurology discussed plan with outpatient neurologist Solomon Dickson. Plan is to discharge on carbamazepine immediate release 200 q6h. Patient is agreeable to plan and states he will set an alarm and will have no difficulties with compliance.     Pt at risk for med nonadherence a w q 6 hour dosing for an anti-epileptic. Last seizure was 4 years ago when he had a lapse in taking his meds. He was counseled prior to d/c about the importance of taking his meds. His wife also at bedside, will help him with adherence. Neurology insisting on q 6 hour dosing for his carbamezepine, needs close OP f/u w Neuro as below.     Patient seen and examined by attending physician on morning of discharge day. Greater than 30 minutes spent on discharge preparation and education.     On day of discharge, patient is clinically stable with no new exam findings or acute symptoms compared to prior. The patient was seen by the attending physician on the date of discharge and deemed stable and acceptable for discharge. The patient's chronic medical conditions were treated accordingly per the patient's home medication regimen. The patient's medication reconciliation (with changes made to chronic medications), follow up appointments, discharge orders, instructions, and significant lab and diagnostic studies are as noted.     Discharge follow up action items:     1. Follow up with   - Neurologist as soon as possible, preferably in < 1 week  2. Medication changes   - stop carbamazepine ER  - start carbamazepine IR q6h  - continue home keppra    Patient's ordered code status: Full    Patient disposition: Home

## 2023-02-28 NOTE — PROGRESS NOTE ADULT - ATTENDING COMMENTS
Patient seen and examined, care plan discussed with house staff as above.    See d/c summary. Pt counseled ab adherence w q 6 hour meds.

## 2023-02-28 NOTE — H&P ADULT - NSHPSOCIALHISTORY_GEN_ALL_CORE
The patient lives at home with his wife, denies use of etoh, tobacco or recreational drugs. Independent with ADLs and ambulation

## 2023-03-02 LAB — LEVETIRACETAM SERPL-MCNC: 11.1 UG/ML — SIGNIFICANT CHANGE UP (ref 10–40)

## 2023-09-29 RX ORDER — CARBAMAZEPINE 200 MG
1 TABLET ORAL
Qty: 0 | Refills: 0 | DISCHARGE

## 2024-02-01 ENCOUNTER — INPATIENT (INPATIENT)
Facility: HOSPITAL | Age: 54
LOS: 0 days | Discharge: HOME | End: 2024-02-02
Attending: STUDENT IN AN ORGANIZED HEALTH CARE EDUCATION/TRAINING PROGRAM | Admitting: STUDENT IN AN ORGANIZED HEALTH CARE EDUCATION/TRAINING PROGRAM
Payer: COMMERCIAL

## 2024-02-01 VITALS
SYSTOLIC BLOOD PRESSURE: 121 MMHG | DIASTOLIC BLOOD PRESSURE: 59 MMHG | OXYGEN SATURATION: 96 % | RESPIRATION RATE: 17 BRPM | TEMPERATURE: 98 F | HEART RATE: 86 BPM

## 2024-02-01 LAB
ALBUMIN SERPL ELPH-MCNC: 3.9 G/DL — SIGNIFICANT CHANGE UP (ref 3.3–5)
ALP SERPL-CCNC: 81 U/L — SIGNIFICANT CHANGE UP (ref 40–120)
ALT FLD-CCNC: 36 U/L — SIGNIFICANT CHANGE UP (ref 4–41)
ANION GAP SERPL CALC-SCNC: 14 MMOL/L — SIGNIFICANT CHANGE UP (ref 7–14)
AST SERPL-CCNC: 40 U/L — SIGNIFICANT CHANGE UP (ref 4–40)
BILIRUB SERPL-MCNC: 0.2 MG/DL — SIGNIFICANT CHANGE UP (ref 0.2–1.2)
BUN SERPL-MCNC: 17 MG/DL — SIGNIFICANT CHANGE UP (ref 7–23)
CALCIUM SERPL-MCNC: 8.9 MG/DL — SIGNIFICANT CHANGE UP (ref 8.4–10.5)
CARBAMAZEPINE SERPL-MCNC: 7.8 UG/ML — SIGNIFICANT CHANGE UP (ref 4–12)
CHLORIDE SERPL-SCNC: 104 MMOL/L — SIGNIFICANT CHANGE UP (ref 98–107)
CO2 SERPL-SCNC: 24 MMOL/L — SIGNIFICANT CHANGE UP (ref 22–31)
CREAT SERPL-MCNC: 0.7 MG/DL — SIGNIFICANT CHANGE UP (ref 0.5–1.3)
EGFR: 110 ML/MIN/1.73M2 — SIGNIFICANT CHANGE UP
GLUCOSE SERPL-MCNC: 90 MG/DL — SIGNIFICANT CHANGE UP (ref 70–99)
HCT VFR BLD CALC: 43.7 % — SIGNIFICANT CHANGE UP (ref 39–50)
HGB BLD-MCNC: 14.3 G/DL — SIGNIFICANT CHANGE UP (ref 13–17)
MAGNESIUM SERPL-MCNC: 2.2 MG/DL — SIGNIFICANT CHANGE UP (ref 1.6–2.6)
MCHC RBC-ENTMCNC: 27.9 PG — SIGNIFICANT CHANGE UP (ref 27–34)
MCHC RBC-ENTMCNC: 32.7 GM/DL — SIGNIFICANT CHANGE UP (ref 32–36)
MCV RBC AUTO: 85.2 FL — SIGNIFICANT CHANGE UP (ref 80–100)
NRBC # BLD: 0 /100 WBCS — SIGNIFICANT CHANGE UP (ref 0–0)
NRBC # FLD: 0 K/UL — SIGNIFICANT CHANGE UP (ref 0–0)
PHOSPHATE SERPL-MCNC: 2.8 MG/DL — SIGNIFICANT CHANGE UP (ref 2.5–4.5)
PLATELET # BLD AUTO: 257 K/UL — SIGNIFICANT CHANGE UP (ref 150–400)
POTASSIUM SERPL-MCNC: 4.3 MMOL/L — SIGNIFICANT CHANGE UP (ref 3.5–5.3)
POTASSIUM SERPL-SCNC: 4.3 MMOL/L — SIGNIFICANT CHANGE UP (ref 3.5–5.3)
PROT SERPL-MCNC: 7.5 G/DL — SIGNIFICANT CHANGE UP (ref 6–8.3)
RBC # BLD: 5.13 M/UL — SIGNIFICANT CHANGE UP (ref 4.2–5.8)
RBC # FLD: 13.5 % — SIGNIFICANT CHANGE UP (ref 10.3–14.5)
SODIUM SERPL-SCNC: 142 MMOL/L — SIGNIFICANT CHANGE UP (ref 135–145)
WBC # BLD: 10.06 K/UL — SIGNIFICANT CHANGE UP (ref 3.8–10.5)
WBC # FLD AUTO: 10.06 K/UL — SIGNIFICANT CHANGE UP (ref 3.8–10.5)

## 2024-02-01 PROCEDURE — 71046 X-RAY EXAM CHEST 2 VIEWS: CPT | Mod: 26

## 2024-02-01 PROCEDURE — 99285 EMERGENCY DEPT VISIT HI MDM: CPT

## 2024-02-01 PROCEDURE — 73100 X-RAY EXAM OF WRIST: CPT | Mod: 26,RT

## 2024-02-01 PROCEDURE — 73120 X-RAY EXAM OF HAND: CPT | Mod: 26,RT

## 2024-02-01 RX ORDER — LEVETIRACETAM 250 MG/1
1500 TABLET, FILM COATED ORAL ONCE
Refills: 0 | Status: COMPLETED | OUTPATIENT
Start: 2024-02-01 | End: 2024-02-01

## 2024-02-01 RX ORDER — LEVETIRACETAM 250 MG/1
1000 TABLET, FILM COATED ORAL
Qty: 0 | Refills: 0 | DISCHARGE

## 2024-02-01 RX ORDER — TETANUS TOXOID, REDUCED DIPHTHERIA TOXOID AND ACELLULAR PERTUSSIS VACCINE, ADSORBED 5; 2.5; 8; 8; 2.5 [IU]/.5ML; [IU]/.5ML; UG/.5ML; UG/.5ML; UG/.5ML
0.5 SUSPENSION INTRAMUSCULAR ONCE
Refills: 0 | Status: DISCONTINUED | OUTPATIENT
Start: 2024-02-01 | End: 2024-02-01

## 2024-02-01 RX ORDER — CARBAMAZEPINE 200 MG
300 TABLET ORAL
Refills: 0 | Status: DISCONTINUED | OUTPATIENT
Start: 2024-02-01 | End: 2024-02-02

## 2024-02-01 RX ORDER — TETANUS TOXOID, REDUCED DIPHTHERIA TOXOID AND ACELLULAR PERTUSSIS VACCINE, ADSORBED 5; 2.5; 8; 8; 2.5 [IU]/.5ML; [IU]/.5ML; UG/.5ML; UG/.5ML; UG/.5ML
0.5 SUSPENSION INTRAMUSCULAR ONCE
Refills: 0 | Status: COMPLETED | OUTPATIENT
Start: 2024-02-01 | End: 2024-02-01

## 2024-02-01 RX ORDER — LEVETIRACETAM 250 MG/1
1000 TABLET, FILM COATED ORAL
Refills: 0 | Status: DISCONTINUED | OUTPATIENT
Start: 2024-02-01 | End: 2024-02-02

## 2024-02-01 RX ORDER — LEVETIRACETAM 250 MG/1
1500 TABLET, FILM COATED ORAL ONCE
Refills: 0 | Status: DISCONTINUED | OUTPATIENT
Start: 2024-02-01 | End: 2024-02-01

## 2024-02-01 RX ORDER — SODIUM CHLORIDE 9 MG/ML
1000 INJECTION INTRAMUSCULAR; INTRAVENOUS; SUBCUTANEOUS ONCE
Refills: 0 | Status: COMPLETED | OUTPATIENT
Start: 2024-02-01 | End: 2024-02-01

## 2024-02-01 RX ORDER — LEVETIRACETAM 250 MG/1
1500 TABLET, FILM COATED ORAL
Refills: 0 | Status: DISCONTINUED | OUTPATIENT
Start: 2024-02-02 | End: 2024-02-02

## 2024-02-01 RX ADMIN — SODIUM CHLORIDE 1000 MILLILITER(S): 9 INJECTION INTRAMUSCULAR; INTRAVENOUS; SUBCUTANEOUS at 22:28

## 2024-02-01 RX ADMIN — TETANUS TOXOID, REDUCED DIPHTHERIA TOXOID AND ACELLULAR PERTUSSIS VACCINE, ADSORBED 0.5 MILLILITER(S): 5; 2.5; 8; 8; 2.5 SUSPENSION INTRAMUSCULAR at 22:25

## 2024-02-01 RX ADMIN — LEVETIRACETAM 400 MILLIGRAM(S): 250 TABLET, FILM COATED ORAL at 22:24

## 2024-02-01 RX ADMIN — Medication 300 MILLIGRAM(S): at 23:14

## 2024-02-01 NOTE — ED PROVIDER NOTE - CLINICAL SUMMARY MEDICAL DECISION MAKING FREE TEXT BOX
53M with epilepsy (since childhood) on keppra and carbamazepine, HTN presents with multiple seizure episodes. Patient restarted on his anti-epileptic regiment as he has been without medication for over 18 hours. Patient with suspected syncopal vs seizure episodes, will perform basic syncopal workup and seizure workup as well as infectious workup for persistent cough. Will need neuro consult and EEG when admitted. Patient found to have laceration of forehead and scalp and endorses new Right wrist pain. Will receive stables to scalp laceration and XR of R Wrist, CT Head given head trauma.    Spoke with patient's Neurologist regarding patient's anti-epileptic regiment. Patient had a hospitalization last year for carbamazepine toxicity at [facility] when he was on Carbamazepine 400 BID; the regiment which he was stable for 20 years. He had taken an extra dose of his carbamazepine, but out of caution, his regiment was decreased to 300 BID; he recommended possibly returning to his 400 BID dosing after workup. 53M with epilepsy (since childhood) on keppra and carbamazepine, HTN presents with multiple seizure episodes. Patient restarted on his anti-epileptic regiment as he has been without medication for over 18 hours. Patient with suspected syncopal vs seizure episodes, will perform basic syncopal workup and seizure workup as well as infectious workup for persistent cough. Will need neuro consult and EEG when admitted. Patient found to have laceration of forehead and scalp and endorses new Right wrist pain. Will receive stables to scalp laceration and XR of R Wrist, CT Head given head trauma.    Spoke with patient's Neurologist regarding patient's anti-epileptic regiment. Patient had a hospitalization last year for carbamazepine toxicity at [facility] when he was on Carbamazepine 400 BID; the regiment which he was stable for 20 years. He had taken an extra dose of his carbamazepine, but out of caution, his regiment was decreased to 300 BID; he recommended possibly returning to his 400 BID dosing after workup.    Mikey, Attending  See Attestation

## 2024-02-01 NOTE — ED PROVIDER NOTE - PHYSICAL EXAMINATION
T(C): 36.7 (02-01-24 @ 20:09), Max: 36.7 (02-01-24 @ 20:09)  HR: 86 (02-01-24 @ 20:09) (86 - 86)  BP: 121/59 (02-01-24 @ 20:09) (121/59 - 121/59)  RR: 17 (02-01-24 @ 20:09) (17 - 17)  SpO2: 96% (02-01-24 @ 20:09) (96% - 96%)    CONSTITUTIONAL: Well groomed, no apparent distress  EYES: PERRLA and symmetric, EOMI,   ENMT: dry oral mucosa  RESP: No respiratory distress, no use of accessory muscles; CTA b/l, no WRR  CV: RRR, +S1S2, no MRG; no JVD; no peripheral edema  GI: Soft, NT, ND, no rebound, no guarding; no palpable masses; no hepatosplenomegaly; no hernia palpated  SKIN: Left forehead small laceration, left scalp laceration approx 4 cm  NEURO: CN II-XII intact; normal reflexes in upper and lower extremities, sensation intact in upper and lower extremities b/l to light touch   PSYCH: Appropriate insight/judgment; A+O x 3, mood and affect appropriate, recent/remote memory intact

## 2024-02-01 NOTE — ED ADULT NURSE NOTE - OBJECTIVE STATEMENT
génesis rn. Patient arrives to 27., Patient PHX epilepsy. Stating he was coughing today and had a syncopal episode. Patient fell hit his head. Patient also states has had 3 seizures today alone. Patient delay medication administration due to patient having to change times. Patient arrives denying chest pain sob n/v. IV placed awaiting for oredrs.

## 2024-02-01 NOTE — CONSULT NOTE ADULT - ASSESSMENT
HPI: A 53 male with PMH of epilepsy (since childhood) on keppra 1 g AM/1.5 g PM and carbamazepine 300 mg BID presented with multiple seizure episodes today. Patient has been well controlled on his regiment for the past 20 years;  On 01/27/24; patient reports he had a seizure episode which involves generalized body shakes. Earlier this morning; patient last took seizure medications at 4am-5am; and around 5 AM patient reports he was coughing and had an episode where he fell from standing (no LOC), remembered he hit his head and had a laceration of his left scalp and a hematoma of his left forehead, he reports he may have had a seizure episode afterwards. After patient arrived home, he experienced another seizure episode, this time witnessed by his wife who has seen prior seizures before and had another one while they were driving to the hospital. These episodes lasted less than 5 minutes without any intervention. With his episodes, he has rapid blinking with whole body shaking for less than 5 minutes and after patient feels extremely fatigued, but was not disoriented/confused.  For the past 3 weeks, patient has been endorsing a cough of unknown origin for the past 3 weeks which has interfered with his sleep. He has had worsening fatigue with this cough which has been a trigger for his seizures in the past.     Per primary team who spoke to his neurologist (Dr. Randolph Bucio (Cell: [phone], Office [phone]), patient had a hospitalization last year for carbamazepine toxicity at [facility] when he was on Carbamazepine 400 BID; the regiment which he was stable for 20 years. He had taken an extra dose of his carbamazepine at that time, but out of caution, his regiment was decreased to 300 BID; he recommended possibly returning to his 400 BID dosing after workup.    Impression: Breakthrough focal impaired awareness seizures possibly in the setting of toxic/metabolic/infectious processes vs something else.     Recommendations:   F/U CT head, as ordered by primary team  - 24 Hour EEG  -AED recommendations: Continue home AEDs for now - keppra 1 g AM/1.5 g PM and carbamazepine 300 mg BID   -Administer  2 mg IV Ativan or 5 mg IV valium PRN STAT for seizure activity or GTC > 3 minutes or significant derangement of vital signs.  -Administer 1500 mg IV Keppra over 15 minutes for seizures refractory to ativan/valium.  -Toxic/metabolic/infectious work up per primary team- CBC, CMP, urine toxicology, UA, urine cx, blood cx, alcohol level, AED levels, CK, CPK, lactate level    Miscellaneous:  [] Q4H neurological checks and vital signs  [] Seizure, fall and aspiration precautions. Avoid sleep deprivation.  -If pt has a convulsion, please document accurately the lenght of episode and specifically what the pt was doing paying attention to eye blinking vs. closure, gaze deviation, shaking of extremities, tongue biting, urinary incontinence, any derangements of vital signs  -Advise pt not to drive, operate heavy machinery, avoid heights, pools, bathtubs, locked doors.     To be discussed with neurology attending and will be formally staffed by attending during morning rounds. Recommendations will be finalized once signed by attending.      HPI: A 53 male with PMH of epilepsy (since childhood) on keppra 1 g AM/1.5 g PM and carbamazepine 300 mg BID presented with multiple seizure episodes today. Patient has been well controlled on his regiment for the past 20 years;  On 01/27/24; patient reports he had a seizure episode which involves generalized body shakes. Earlier this morning; patient last took seizure medications at 4am-5am; and around 5 AM patient reports he was coughing and had an episode where he fell from standing (no LOC), remembered he hit his head and had a laceration of his left scalp and a hematoma of his left forehead, he reports he may have had a seizure episode afterwards. After patient arrived home, he experienced another seizure episode, this time witnessed by his wife who has seen prior seizures before and had another one while they were driving to the hospital. These episodes lasted less than 5 minutes without any intervention. With his episodes, he has rapid blinking with whole body shaking for less than 5 minutes and after patient feels extremely fatigued, but was not disoriented/confused.  For the past 3 weeks, patient has been endorsing a cough of unknown origin for the past 3 weeks which has interfered with his sleep. He has had worsening fatigue with this cough which has been a trigger for his seizures in the past.     Per primary team who spoke to his neurologist (Dr. Randolph Bucio (Cell: [phone], Office [phone]), patient had a hospitalization last year for carbamazepine toxicity at [facility] (CBZ level 16.8) when he was on Carbamazepine 400 BID; the regiment which he was stable for 20 years. He had taken an extra dose of his carbamazepine at that time, but out of caution. He was discharged from the hospital on CBZ  mg Q6. His regimen was decreased by his neurologist to 300 BID afterwards; he recommended possibly returning to his 400 BID dosing after workup.    Impression: Breakthrough focal impaired awareness seizures possibly in the setting of toxic/metabolic/infectious processes vs something else.     Recommendations:   F/U CT head, as ordered by primary team  - 24 Hour EEG  -AED recommendations: Continue home AEDs for now - keppra 1 g AM/1.5 g PM and carbamazepine 300 mg BID   -Administer  2 mg IV Ativan or 5 mg IV valium PRN STAT for seizure activity or GTC > 3 minutes or significant derangement of vital signs.  -Administer 1500 mg IV Keppra over 15 minutes for seizures refractory to ativan/valium.  -Toxic/metabolic/infectious work up per primary team- CBC, CMP, urine toxicology, UA, urine cx, blood cx, alcohol level, AED levels, CK, CPK, lactate level    Miscellaneous:  [] Q4H neurological checks and vital signs  [] Seizure, fall and aspiration precautions. Avoid sleep deprivation.  -If pt has a convulsion, please document accurately the lenght of episode and specifically what the pt was doing paying attention to eye blinking vs. closure, gaze deviation, shaking of extremities, tongue biting, urinary incontinence, any derangements of vital signs  -Advise pt not to drive, operate heavy machinery, avoid heights, pools, bathtubs, locked doors.     To be discussed with neurology attending and will be formally staffed by attending during morning rounds. Recommendations will be finalized once signed by attending.      HPI: A 53 male with PMH of epilepsy (since childhood) on keppra 1 g AM/1.5 g PM and carbamazepine 300 mg BID presented with multiple seizure episodes today. Patient has been well controlled on his regiment for the past 20 years;  On 01/27/24; patient reports he had a seizure episode which involves generalized body shakes. Earlier this morning; patient last took seizure medications at 4am-5am; and around 5 AM patient reports he was coughing and had an episode where he fell from standing (no LOC), remembered he hit his head and had a laceration of his left scalp and a hematoma of his left forehead, he denies having a seizure at that time. After patient arrived home from work, he experienced another seizure episode, this time witnessed by his wife who has seen prior seizures before and had another one while they were driving to the hospital. These episodes lasted less than 5 minutes without any intervention. With his episodes, he had rapid blinking but no shaking for less than 5 minutes and after patient felt extremely fatigued, but was not disoriented/confused.  Last Saturday, he had another episode but in addition to the blinking he also had whole body shaking. For the past 3 weeks, patient has been endorsing a cough of unknown origin for the past 3 weeks which has interfered with his sleep. He has had worsening fatigue with this cough which has been a trigger for his seizures in the past.     Per primary team who spoke to his neurologist (Dr. Randolph Bucio (Cell: [phone], Office [phone]), patient had a hospitalization last year for carbamazepine toxicity at [facility] (CBZ level 16.8) when he was on Carbamazepine 400 BID; the regiment which he was stable for 20 years. He had taken an extra dose of his carbamazepine at that time, but out of caution. He was discharged from the hospital on  mg BID. His neurologist recommended possibly returning to his 400 BID dosing after workup during this admission.     Impression: Breakthrough focal impaired awareness seizures possibly in the setting of toxic/metabolic/infectious processes vs something else.     Recommendations:   F/U CT head, as ordered by primary team  - 24 Hour EEG  -AED recommendations: Continue home AEDs for now - keppra 1 g AM/1.5 g PM and carbamazepine 300 mg BID   -Administer  2 mg IV Ativan or 5 mg IV valium PRN STAT for seizure activity or GTC > 3 minutes or significant derangement of vital signs.  -Administer 1500 mg IV Keppra over 15 minutes for seizures refractory to ativan/valium.  -Toxic/metabolic/infectious work up per primary team- CBC, CMP, urine toxicology, UA, urine cx, blood cx, alcohol level, AED levels, CK, CPK, lactate level    Miscellaneous:  [] Q4H neurological checks and vital signs  [] Seizure, fall and aspiration precautions. Avoid sleep deprivation.  -If pt has a convulsion, please document accurately the lenght of episode and specifically what the pt was doing paying attention to eye blinking vs. closure, gaze deviation, shaking of extremities, tongue biting, urinary incontinence, any derangements of vital signs  -Advise pt not to drive, operate heavy machinery, avoid heights, pools, bathtubs, locked doors.     To be discussed with neurology attending and will be formally staffed by attending during morning rounds. Recommendations will be finalized once signed by attending.      HPI: A 53 male with PMH of epilepsy (since childhood) on keppra 1 g AM/1.5 g PM and carbamazepine 300 mg BID presented with multiple seizure episodes today. Patient has been well controlled on his regiment for the past 20 years;  On 01/27/24; patient reports he had a seizure episode which involves generalized body shakes. Earlier this morning; patient last took seizure medications at 4am-5am; and around 5 AM patient reports he was coughing and had an episode where he fell from standing (no LOC), remembered he hit his head and had a laceration of his left scalp and a hematoma of his left forehead, he denies having a seizure at that time. After patient arrived home from work, he experienced another seizure episode, this time witnessed by his wife who has seen prior seizures before and had another one while they were driving to the hospital. These episodes lasted less than 5 minutes without any intervention. With his episodes, he had rapid blinking but no shaking for less than 5 minutes and after patient felt extremely fatigued, but was not disoriented/confused.  Last Saturday, he had another episode but in addition to the blinking he also had whole body shaking. He remembers his episodes. For the past 3 weeks, patient has been endorsing a cough of unknown origin for the past 3 weeks which has interfered with his sleep. He has had worsening fatigue with this cough which has been a trigger for his seizures in the past.     Per primary team who spoke to his neurologist (Dr. Randolph Bucio (Cell: [phone], Office [phone]), patient had a hospitalization last year for carbamazepine toxicity at [facility] (CBZ level 16.8) when he was on Carbamazepine 400 BID; the regiment which he was stable for 20 years. He had taken an extra dose of his carbamazepine at that time, but out of caution. He was discharged from the hospital on  mg BID. His neurologist recommended possibly returning to his 400 BID dosing after workup during this admission.     Impression: Breakthrough focal nonimpaired awareness seizures possibly in the setting of toxic/metabolic/infectious processes vs something else.     Recommendations:   F/U CT head, as ordered by primary team  - 24 Hour EEG  -AED recommendations: Continue home AEDs for now - keppra 1 g AM/1.5 g PM and carbamazepine 300 mg BID   -Administer  2 mg IV Ativan or 5 mg IV valium PRN STAT for seizure activity or GTC > 3 minutes or significant derangement of vital signs.  -Administer 1500 mg IV Keppra over 15 minutes for seizures refractory to ativan/valium.  -Toxic/metabolic/infectious work up per primary team- CBC, CMP, urine toxicology, UA, urine cx, blood cx, alcohol level, AED levels, CK, CPK, lactate level    Miscellaneous:  [] Q4H neurological checks and vital signs  [] Seizure, fall and aspiration precautions. Avoid sleep deprivation.  -If pt has a convulsion, please document accurately the lenght of episode and specifically what the pt was doing paying attention to eye blinking vs. closure, gaze deviation, shaking of extremities, tongue biting, urinary incontinence, any derangements of vital signs  -Advise pt not to drive, operate heavy machinery, avoid heights, pools, bathtubs, locked doors.     To be discussed with neurology attending and will be formally staffed by attending during morning rounds. Recommendations will be finalized once signed by attending.      HPI: A 53 male with PMH of epilepsy (since childhood) on keppra 1 g AM/1.5 g PM and carbamazepine 300 mg BID presented with multiple seizure episodes today. Patient has been well controlled on his regiment for the past 20 years;  On 01/27/24; patient reports he had a seizure episode which involves generalized body shakes. Earlier this morning; patient last took seizure medications at 4am-5am; and around 5 AM patient reports he was coughing and had an episode where he fell from standing (no LOC), remembered he hit his head and had a laceration of his left scalp and a hematoma of his left forehead, he denies having a seizure at that time. After patient arrived home from work, he experienced another seizure episode, this time witnessed by his wife who has seen prior seizures before and had another one while they were driving to the hospital. These episodes lasted less than 5 minutes without any intervention. With his episodes, he had rapid blinking but no shaking for less than 5 minutes and after patient felt extremely fatigued, but was not disoriented/confused.  Last Saturday, he had another episode but in addition to the blinking he also had whole body shaking. He remembers his episodes. For the past 3 weeks, patient has been endorsing a cough of unknown origin for the past 3 weeks which has interfered with his sleep. He has had worsening fatigue with this cough which has been a trigger for his seizures in the past.     Per primary team who spoke to his neurologist (Dr. Randolph Bucio (Cell: [phone], Office [phone]), patient had a hospitalization last year for carbamazepine toxicity at [facility] (CBZ level 16.8) when he was on Carbamazepine 400 BID; the regiment which he was stable for 20 years. He had taken an extra dose of his carbamazepine at that time, but out of caution. He was discharged from the hospital on  mg BID. His neurologist recommended possibly returning to his 400 BID dosing after workup during this admission.     Impression: Breakthrough focal nonimpaired awareness seizures possibly in the setting of toxic/metabolic/infectious processes vs something else.     Recommendations:   F/U CT head, as ordered by primary team  - 24 Hour EEG  -AED recommendations: Continue home AEDs for now - keppra 1 g AM/1.5 g PM and carbamazepine 300 mg BID   -Administer  2 mg IV Ativan STAT for seizure activity or GTC > 3 minutes WITH significant derangement of vital signs only.  -Administer 1500 mg IV Keppra over 15 minutes for seizures refractory to ativan/valium.  -Toxic/metabolic/infectious work up per primary team- CBC, CMP, urine toxicology, UA, urine cx, blood cx, alcohol level, AED levels, CK, CPK, lactate level    Miscellaneous:  [] Q4H neurological checks and vital signs  [] Seizure, fall and aspiration precautions. Avoid sleep deprivation.  -If pt has a convulsion, please document accurately the lenght of episode and specifically what the pt was doing paying attention to eye blinking vs. closure, gaze deviation, shaking of extremities, tongue biting, urinary incontinence, any derangements of vital signs  -Advise pt not to drive, operate heavy machinery, avoid heights, pools, bathtubs, locked doors.     To be discussed with neurology attending and will be formally staffed by attending during morning rounds. Recommendations will be finalized once signed by attending.      HPI: A 53 male with PMH of epilepsy (since childhood) on keppra 1 g AM/1.5 g PM and carbamazepine 300 mg BID presented with multiple seizure episodes today. Patient has been well controlled on his regiment for the past 20 years;  On 01/27/24; patient reports he had a seizure episode which involves generalized body shakes. Earlier this morning; patient last took seizure medications at 4am-5am; and around 5 AM patient reports he was coughing and had an episode where he fell from standing (no LOC), remembered he hit his head and had a laceration of his left scalp and a hematoma of his left forehead, he denies having a seizure at that time. After patient arrived home from work, he experienced another seizure episode, this time witnessed by his wife who has seen prior seizures before and had another one while they were driving to the hospital. These episodes lasted less than 5 minutes without any intervention. With his episodes, he had rapid blinking but no shaking for less than 5 minutes and after patient felt extremely fatigued, but was not disoriented/confused.  Last Saturday, he had another episode but in addition to the blinking he also had whole body shaking. He remembers his episodes. For the past 3 weeks, patient has been endorsing a cough of unknown origin for the past 3 weeks which has interfered with his sleep. He has had worsening fatigue with this cough which has been a trigger for his seizures in the past.     Per primary team who spoke to his neurologist (Dr. Randolph Bucio (Cell: [phone], Office [phone]), patient had a hospitalization last year for carbamazepine toxicity at [facility] (CBZ level 16.8) when he was on Carbamazepine 400 BID; the regiment which he was stable for 20 years. He had taken an extra dose of his carbamazepine at that time, but out of caution. He was discharged from the hospital on  mg BID. His neurologist recommended possibly returning to his 400 BID dosing after workup during this admission.     Impression: Breakthrough focal nonimpaired awareness seizures possibly in the setting of toxic/metabolic/infectious processes vs something else.     Recommendations:   F/U CT head, as ordered by primary team  - 24 Hour EEG  -AED recommendations:  keppra 1 g AM/1.5 g PM and increase carbamazepine to 400 mg BID   -Administer  2 mg IV Ativan STAT for seizure activity or GTC > 3 minutes WITH significant derangement of vital signs only.  -Administer 1500 mg IV Keppra over 15 minutes for seizures refractory to ativan/valium.  -Toxic/metabolic/infectious work up per primary team- CBC, CMP, urine toxicology, UA, urine cx, blood cx, alcohol level, AED levels, CK, CPK, lactate level    Miscellaneous:  [] Q4H neurological checks and vital signs  [] Seizure, fall and aspiration precautions. Avoid sleep deprivation.  -If pt has a convulsion, please document accurately the lenght of episode and specifically what the pt was doing paying attention to eye blinking vs. closure, gaze deviation, shaking of extremities, tongue biting, urinary incontinence, any derangements of vital signs  -Advise pt not to drive, operate heavy machinery, avoid heights, pools, bathtubs, locked doors.     To be discussed with neurology attending and will be formally staffed by attending during morning rounds. Recommendations will be finalized once signed by attending.      HPI: A 53 male with PMH of epilepsy (since childhood) on keppra 1 g AM/1.5 g PM and carbamazepine 300 mg BID presented with multiple seizure episodes today. Patient has been well controlled on his regiment for the past 20 years;  On 01/27/24; patient reports he had a seizure episode which involves generalized body shakes. Earlier this morning; patient last took seizure medications at 4am-5am; and around 5 AM patient reports he was coughing and had an episode where he fell from standing (no LOC), remembered he hit his head and had a laceration of his left scalp and a hematoma of his left forehead, he denies having a seizure at that time. After patient arrived home from work, he experienced another seizure episode, this time witnessed by his wife who has seen prior seizures before and had another one while they were driving to the hospital. These episodes lasted less than 5 minutes without any intervention. With his episodes, he had rapid blinking but no shaking for less than 5 minutes and after patient felt extremely fatigued, but was not disoriented/confused.  Last Saturday, he had another episode but in addition to the blinking he also had whole body shaking. He remembers his episodes. For the past 3 weeks, patient has been endorsing a cough of unknown origin for the past 3 weeks which has interfered with his sleep. He has had worsening fatigue with this cough which has been a trigger for his seizures in the past.     Per primary team who spoke to his neurologist (Dr. Randolph Bucio (Cell: [phone], Office [phone]), patient had a hospitalization last year for carbamazepine toxicity at [facility] (CBZ level 16.8) when he was on Carbamazepine 400 BID; the regiment which he was stable for 20 years. He had taken an extra dose of his carbamazepine at that time, but out of caution. He was discharged from the hospital on  mg BID. His neurologist recommended possibly returning to his 400 BID dosing after workup during this admission.     Impression: Breakthrough focal nonimpaired awareness seizures possibly in the setting of toxic/metabolic/infectious processes vs something else.     Recommendations:   -Pt may be discharged home with outpatient follow up with his neurologist.   - 24 Hour EEG, can be done in out patient setting  -AED recommendations:  keppra 1 g AM/1.5 g PM and increase carbamazepine to 400 mg BID   -Toxic/metabolic/infectious work up per primary team- CBC, CMP, urine toxicology, UA, urine cx, blood cx, alcohol level, AED levels, CK, CPK, lactate level  -Advise pt not to drive, operate heavy machinery, avoid heights, pools, bathtubs, locked doors.   -Administer  2 mg IV Ativan STAT for seizure activity or GTC > 3 minutes WITH significant derangement of vital signs only.  -Administer 1500 mg IV Keppra over 15 minutes for seizures refractory to ativan/valium.    Case discussed with neurologist Dr. Bell     HPI: A 53 male with PMH of epilepsy (since childhood) on keppra 1 g AM/1.5 g PM and carbamazepine 300 mg BID presented with multiple seizure episodes today. Patient has been well controlled on his regiment for the past 20 years;  On 01/27/24; patient reports he had a seizure episode which involves generalized body shakes. Earlier this morning; patient last took seizure medications at 4am-5am; and around 5 AM patient reports he was coughing and had an episode where he fell from standing (no LOC), remembered he hit his head and had a laceration of his left scalp and a hematoma of his left forehead, he denies having a seizure at that time. After patient arrived home from work, he experienced another seizure episode, this time witnessed by his wife who has seen prior seizures before and had another one while they were driving to the hospital. These episodes lasted less than 5 minutes without any intervention. With his episodes, he had rapid blinking but no shaking for less than 5 minutes and after patient felt extremely fatigued, but was not disoriented/confused.  Last Saturday, he had another episode but in addition to the blinking he also had whole body shaking. He remembers his episodes. For the past 3 weeks, patient has been endorsing a cough of unknown origin for the past 3 weeks which has interfered with his sleep. He has had worsening fatigue with this cough which has been a trigger for his seizures in the past.     Per primary team who spoke to his neurologist (Dr. Randolph Bucio (Cell: [phone], Office [phone]), patient had a hospitalization last year for carbamazepine toxicity at [facility] (CBZ level 16.8) when he was on Carbamazepine 400 BID; the regiment which he was stable for 20 years. He had taken an extra dose of his carbamazepine at that time, but out of caution. He was discharged from the hospital on  mg BID. His neurologist recommended possibly returning to his 400 BID dosing after workup during this admission.     Impression: Breakthrough focal nonimpaired awareness seizures possibly in the setting of toxic/metabolic/infectious processes vs something else.     Recommendations:   -Pt may be discharged home with close outpatient follow up with his neurologist in 1 week.   - 24 Hour EEG, can be done in out patient setting  -AED recommendations:  keppra 1 g AM/1.5 g PM and increase carbamazepine to 400 mg BID   -Toxic/metabolic/infectious work up per primary team- CBC, CMP, urine toxicology, UA, urine cx, blood cx, alcohol level, AED levels, CK, CPK, lactate level  -Advise pt not to drive, operate heavy machinery, avoid heights, pools, bathtubs, locked doors.   -Administer  2 mg IV Ativan STAT for seizure activity or GTC > 3 minutes WITH significant derangement of vital signs only.  -Administer 1500 mg IV Keppra over 15 minutes for seizures refractory to ativan/valium.    Case discussed with neurologist Dr. Bell

## 2024-02-01 NOTE — ED PROVIDER NOTE - OBJECTIVE STATEMENT
52M with epilepsy (since childhood) on keppra and carbamazepine for decades, last seizure four years prior, presenting with a syncopal episode after coughing.    Patient had stopped his anti-epileptics for 12 hours to change the schedule.   Laceration/hematoma in the forhead. 53M with epilepsy (since childhood) on keppra and carbamazepine for decades, last seizure four years prior, presenting with a syncopal episode after coughing.    Patient had stopped his anti-epileptics for 12 hours to change the schedule.   Laceration/hematoma in the forhead. 53M with epilepsy (since childhood) on keppra and carbamazepine for decades, last seizure four years prior, presenting with multiple seizure episodes today.    Patient has been well controlled on his regiment for the past 20 years; Follows with Dr. Randolph Bucio (Cell: [phone], Office [phone]) For the past 3 weeks, patient has been endorsing a cough of unknown origin for the past 3 weeks which has interfered with his sleep. He has had worsening fatigue with this cough which has been a trigger for his seizures in the past. On 01/27/24; patient reports he had a seizure episode which involves generalized body shakes. Earlier this morning; patient last took seizure medications at 4am-5am; and around 5pm; patient reports he had an episode where he feel from standing, remembered he hit his head and had a laceration of his left scalp and a hematoma of his left forehead, he reports he may have had a seizure episode afterwards.    Patient had stopped his anti-epileptics for 12 hours to change the schedule.   Laceration/hematoma in the forhead. 53M with epilepsy (since childhood) on keppra and carbamazepine for decades, last seizure four years prior, presenting with multiple seizure episodes today.    Patient has been well controlled on his regiment for the past 20 years; Follows with Dr. Randolph Bucio (Cell: [phone], Office [phone]) For the past 3 weeks, patient has been endorsing a cough of unknown origin for the past 3 weeks which has interfered with his sleep. He has had worsening fatigue with this cough which has been a trigger for his seizures in the past. On 01/27/24; patient reports he had a seizure episode which involves generalized body shakes. Earlier this morning; patient last took seizure medications at 4am-5am; and around 5pm; patient reports he had an episode where he feel from standing, remembered he hit his head and had a laceration of his left scalp and a hematoma of his left forehead, he reports he may have had a seizure episode afterwards. After patient arrived home, he experienced another seizure episode, this time witnessed by his wife who has seen prior seizures before and had another one while they were driving to the hospital. These episodes lasted less than 5 minutes without any intervention. Afterward episodes, patient felt extremely fatigued, but was not disoriented/confused.

## 2024-02-01 NOTE — ED ADULT TRIAGE NOTE - CHIEF COMPLAINT QUOTE
pt with a known seizure disorder has not taken his meds in 12 hrs on the recommendation of his neurologist, to change the schedule. had a syncopal episode after experiencing an episode of coughing. pt believes he had a seizure prior while at work. laceration noted to the top of the head and hematoma to the forehead. pt brought to [facility]

## 2024-02-01 NOTE — ED PROVIDER NOTE - ATTENDING CONTRIBUTION TO CARE
I performed a history and physical exam of the patient and discussed their management with the resident/fellow/ACP/student. I have reviewed the resident/fellow/ACP/student note and agree with the documented findings and plan of care, except as noted. I have personally performed a substantive portion of the visit including all aspects of the medical decision making. My medical decision making and observations are found above. Please refer to any progress notes for updates on clinical course.    54 y/o male with pmhx of epilepsy (since childhood) on keppra and carbamazepine, last seizure many years ago, presenting with multiple seizures and syncope. Patient reports for past 3 weeks he has had chronic nonproductive cough causing decreased sleep and generalized fatigue.  Reports cough has been a trigger for seizures in the past.  Had seizure on July 27 describes as full body shaking consistent with usual generalized tonic-clonic seizures.  Last antiepileptics at 5 AM this morning.  Wife at bedside reports 3 seizures today, witnessed with tonic-clonic movements, lasting less than 5 minutes that resolved on own. Patient also had syncopal episode today while standing, hit his head sustained laceration to left side of head at back/forehead, unclear if patient had seizure-like symptoms at this time as it was unwitnessed.  Reports mild diarrhea with no fever/chills, N/V, rashes, dysuria, hematuria, chest pain, shortness of breath, focal weakness, numbness/tingling, changes in vision/hearing. No AC use. Unsure if UTD with tetanus. Also describes mild right wrist pain from fall.     Gen: WDWN, NAD, comfortable appearing, afebrile   HEENT: 4cm laceration to left occiput and small superficial laceration to left forehead, no riojas sign, PERRLA, EOMI, no nasal discharge, mucous membranes moist, no oropharyngeal edema/erythema/exudates/lacerations   CV: RRR, +S1/S2, no M/R/G, equal b/l radial pulses 2+  Resp: CTAB, no W/R/R, SPO2 >95% on RA, no increased WOB   GI: Abdomen soft non-distended, NTTP, no masses/organomegaly   MSK/Skin: No midline spinal TTP, no CVA tenderness, no bruising, no LE edema, no pelvic laxity. Right midline wrist TTP; ranging with palpable radial 2+ pulse and soft compartments, no snuffbox TTP    Neuro: CN2-12 grossly intact, A&Ox4, MS +5/5 in UE and LE BL, gross sensation intact in UE and LE BL,  negative pronator drift   Psych: appropriate mood    MDM:  54 y/o male with pmhx of epilepsy (since childhood) on keppra and carbamazepine, last seizure many years ago, presenting with multiple seizures and syncope, recent nonproductive cough for past couple weeks, afebrile, hemodynamically stable, laceration to left occiput and left forehead, right wrist pain; neurovascularly intact with soft compartments, otherwise no other evidence of external trauma, no FND, no riojas sign.  Exam/history concerning for but not limited to seizures in setting of possible anemia versus metabolic derangement versus infectious etiology.  Will explore possible sources of infection, give nighttime antiepileptics, obtain basic labs, cardiac enzymes given also syncopal episode/EKG, CT head given fall with laceration, and reassess.  Patient will require tetanus and laceration repair I performed a history and physical exam of the patient and discussed their management with the resident/fellow/ACP/student. I have reviewed the resident/fellow/ACP/student note and agree with the documented findings and plan of care, except as noted. I have personally performed a substantive portion of the visit including all aspects of the medical decision making. My medical decision making and observations are found above. Please refer to any progress notes for updates on clinical course.    52 y/o male with pmhx of epilepsy (since childhood) on keppra and carbamazepine, last seizure many years ago, presenting with multiple seizures and syncope. Patient reports for past 3 weeks he has had chronic nonproductive cough causing decreased sleep and generalized fatigue.  Reports cough has been a trigger for seizures in the past.  Had seizure on July 27 describes as full body shaking consistent with usual generalized tonic-clonic seizures.  Last antiepileptics at 5 AM this morning.  Wife at bedside reports 3 seizures today, witnessed with tonic-clonic movements, lasting less than 5 minutes that resolved on own. Patient also had syncopal episode today while standing, hit his head sustained laceration to left side of head at back/forehead, unclear if patient had seizure-like symptoms at this time as it was unwitnessed.  Reports mild diarrhea with no fever/chills, N/V, rashes, dysuria, hematuria, chest pain, shortness of breath, focal weakness, numbness/tingling, changes in vision/hearing. No AC use. Unsure if UTD with tetanus. Also describes mild right wrist pain from fall.     Gen: WDWN, NAD, comfortable appearing, afebrile   HEENT: 4cm laceration to left occiput and small superficial laceration to left forehead, no riojas sign, PERRLA, EOMI, no nasal discharge, mucous membranes moist, no oropharyngeal edema/erythema/exudates/lacerations   CV: RRR, +S1/S2, no M/R/G, equal b/l radial pulses 2+  Resp: CTAB, no W/R/R, SPO2 >95% on RA, no increased WOB   GI: Abdomen soft non-distended, NTTP, no masses/organomegaly   MSK/Skin: No midline spinal TTP, no CVA tenderness, no bruising, no LE edema, no pelvic laxity. Right midline wrist TTP; ranging with palpable radial 2+ pulse and soft compartments, no snuffbox TTP    Neuro: CN2-12 grossly intact, A&Ox4, MS +5/5 in UE and LE BL, gross sensation intact in UE and LE BL,  negative pronator drift   Psych: appropriate mood    MDM:  52 y/o male with pmhx of epilepsy (since childhood) on keppra and carbamazepine, last seizure many years ago, presenting with multiple seizures and syncope, recent nonproductive cough for past couple weeks, afebrile, hemodynamically stable, laceration to left occiput and left forehead, right wrist pain; neurovascularly intact with soft compartments, otherwise no other evidence of external trauma, no FND, no riojas sign.  Exam/history concerning for but not limited to seizures in setting of possible anemia versus metabolic derangement versus infectious etiology.  Will explore possible sources of infection, give nighttime antiepileptics, obtain basic labs, cardiac enzymes given also syncopal episode/EKG, CT head given fall with laceration, and reassess.  Patient will require tetanus and laceration repair. Patient will require neuro consult and likely admission for EEG

## 2024-02-01 NOTE — ED PROVIDER NOTE - NS ED ROS FT
REVIEW OF SYSTEMS:  CONSTITUTIONAL: Generalized fatigue, no fevers, chills,   EYES/ENT: No visual changes;  No vertigo or throat pain, no congestion  NECK: No pain or stiffness  RESPIRATORY: persistent productive cough with white sputum  CARDIOVASCULAR: No chest pain or palpitations  GASTROINTESTINAL: No abdominal or epigastric pain. No nausea, vomiting, or hematemesis; No diarrhea or constipation. No melena or hematochezia.  GENITOURINARY: No dysuria, frequency or hematuria  NEUROLOGICAL: No numbness or weakness  SKIN: No itching, rashes

## 2024-02-01 NOTE — CONSULT NOTE ADULT - SUBJECTIVE AND OBJECTIVE BOX
Neurology - Consult Note    -  Spectra: [phone] ([facility]), [phone] ([facility])  -    HPI: A 53 male with PMH of epilepsy (since childhood) on keppra 1 g AM/1.5 g PM and carbamazepine 300 mg BID presented with multiple seizure episodes today. Patient has been well controlled on his regiment for the past 20 years;  On 01/27/24; patient reports he had a seizure episode which involves generalized body shakes. Earlier this morning; patient last took seizure medications at 4am-5am; and around 5 AM patient reports he was coughing and had an episode where he fell from standing (no LOC), remembered he hit his head and had a laceration of his left scalp and a hematoma of his left forehead, he reports he may have had a seizure episode afterwards. After patient arrived home, he experienced another seizure episode, this time witnessed by his wife who has seen prior seizures before and had another one while they were driving to the hospital. These episodes lasted less than 5 minutes without any intervention. With his episodes, he has rapid blinking with whole body shaking for less than 5 minutes and after patient feels extremely fatigued, but was not disoriented/confused.  For the past 3 weeks, patient has been endorsing a cough of unknown origin for the past 3 weeks which has interfered with his sleep. He has had worsening fatigue with this cough which has been a trigger for his seizures in the past.     Per primary team who spoke to his neurologist (Dr. Randolph Bucio (Cell: [phone], Office [phone]), patient had a hospitalization last year for carbamazepine toxicity at [facility] when he was on Carbamazepine 400 BID; the regiment which he was stable for 20 years. He had taken an extra dose of his carbamazepine at that time, but out of caution, his regiment was decreased to 300 BID; he recommended possibly returning to his 400 BID dosing after workup.    Review of Systems:  CONSTITUTIONAL: No fevers or chills  CARDIOVASCULAR: No chest pain or palpitations  NEUROLOGICAL: +As stated in HPI above  SKIN: No itching, burning, rashes, or lesions   All other review of systems is negative unless indicated above.    Allergies:  No Known Allergies      PMHx/PSHx/Family Hx: As above, otherwise see below   Hypertension    Epilepsy        Social Hx:  No current use of tobacco, alcohol, or illicit drugs    Medications:  MEDICATIONS  (STANDING):  carBAMazepine ER Capsule 300 milliGRAM(s) Oral two times a day  levETIRAcetam 1000 milliGRAM(s) Oral <User Schedule>    MEDICATIONS  (PRN):      Vitals:  T(C): 36.7 (02-01-24 @ 23:15), Max: 36.7 (02-01-24 @ 20:09)  HR: 75 (02-01-24 @ 23:15) (75 - 86)  BP: 150/88 (02-01-24 @ 23:15) (121/59 - 150/88)  RR: 18 (02-01-24 @ 23:15) (17 - 18)  SpO2: 99% (02-01-24 @ 23:15) (96% - 99%)    Physical Examination:  General - NAD  Cardiovascular - Peripheral pulses palpable, no edema  Eyes -; Fundoscopy not performed due to safety precautions in the setting of the COVID-19 pandemic    Neurologic Exam:  Mental status - Awake, Alert, Oriented to person, place, and time. Speech fluent, repetition and naming intact. Follows simple and complex commands. Attention/concentration, recent and remote memory (including registration and recall), and fund of knowledge intact    Cranial nerves - PERRLA, VFF, EOMI, face sensation (V1-V3) intact b/l, facial strength intact without asymmetry b/l, hearing intact b/l, palate with symmetric elevation, trapezius  5/5 strength b/l, tongue midline on protrusion with full lateral movement    Motor - Normal bulk and tone throughout. No pronator drift.  Strength testing            Deltoid      Biceps      Triceps     Wrist Extension    Wrist Flexion     Interossei         R            5                 5               5                     5                              5                        5                 5  L             5                 5               5                     5                              5                        5                 5              Hip Flexion    Hip Extension    Knee Flexion    Knee Extension    Dorsiflexion    Plantar Flexion  R              5                           5                       5                           5                            5                          5  L              5                           5                        5                           5                            5                          5    Sensation - Light touch/temperature intact throughout    DTR's -             Biceps      Triceps     Brachioradialis      Patellar    Ankle    Toes/plantar response  R             2+             2+                  2+                       2+            2+                 Down  L              2+             2+                 2+                        2+           2+                 Down    Coordination - Finger to Nose intact b/l. No tremors appreciated    Gait and station - Normal casual gait. Romberg (-)    Labs:                        14.3   10.06 )-----------( 257      ( 01 Feb 2024 21:30 )             43.7     02-01    142  |  104  |  17  ----------------------------<  90  4.3   |  24  |  0.70    Ca    8.9      01 Feb 2024 21:30  Phos  2.8     02-01  Mg     2.20     02-01    TPro  7.5  /  Alb  3.9  /  TBili  0.2  /  DBili  x   /  AST  40  /  ALT  36  /  AlkPhos  81  02-01    CAPILLARY BLOOD GLUCOSE      POCT Blood Glucose.: 101 mg/dL (01 Feb 2024 20:09)    LIVER FUNCTIONS - ( 01 Feb 2024 21:30 )  Alb: 3.9 g/dL / Pro: 7.5 g/dL / ALK PHOS: 81 U/L / ALT: 36 U/L / AST: 40 U/L / GGT: x               CSF:                  Radiology:     Neurology - Consult Note    -  Spectra: [phone] ([facility]), [phone] ([facility])  -    HPI: A 53 male with PMH of epilepsy (since childhood) on keppra 1 g AM/1.5 g PM and carbamazepine 300 mg BID presented with multiple seizure episodes today. Patient has been well controlled on his regiment for the past 20 years;  On 01/27/24; patient reports he had a seizure episode which involves generalized body shakes. Earlier this morning; patient last took seizure medications at 4am-5am; and around 5 AM patient reports he was coughing and had an episode where he fell from standing (no LOC), remembered he hit his head and had a laceration of his left scalp and a hematoma of his left forehead, he reports he may have had a seizure episode afterwards. After patient arrived home, he experienced another seizure episode, this time witnessed by his wife who has seen prior seizures before and had another one while they were driving to the hospital. These episodes lasted less than 5 minutes without any intervention. With his episodes, he has rapid blinking with whole body shaking for less than 5 minutes and after patient feels extremely fatigued, but was not disoriented/confused.  For the past 3 weeks, patient has been endorsing a cough of unknown origin for the past 3 weeks which has interfered with his sleep. He has had worsening fatigue with this cough which has been a trigger for his seizures in the past.     Per primary team who spoke to his neurologist (Dr. Randolph Bucio (Cell: [phone], Office [phone]), patient had a hospitalization last year for carbamazepine toxicity at [facility] (CBZ level 16.8) when he was on Carbamazepine 400 BID; the regiment which he was stable for 20 years. He had taken an extra dose of his carbamazepine at that time, but out of caution. He was discharged from the hospital on CBZ  mg Q6. His regimen was decreased by his neurologist to 300 BID afterwards; he recommended possibly returning to his 400 BID dosing after workup.    Review of Systems:  CONSTITUTIONAL: No fevers or chills  CARDIOVASCULAR: No chest pain or palpitations  NEUROLOGICAL: +As stated in HPI above  SKIN: No itching, burning, rashes, or lesions   All other review of systems is negative unless indicated above.    Allergies:  No Known Allergies      PMHx/PSHx/Family Hx: As above, otherwise see below   Hypertension    Epilepsy        Social Hx:  No current use of tobacco, alcohol, or illicit drugs    Medications:  MEDICATIONS  (STANDING):  carBAMazepine ER Capsule 300 milliGRAM(s) Oral two times a day  levETIRAcetam 1000 milliGRAM(s) Oral <User Schedule>    MEDICATIONS  (PRN):      Vitals:  T(C): 36.7 (02-01-24 @ 23:15), Max: 36.7 (02-01-24 @ 20:09)  HR: 75 (02-01-24 @ 23:15) (75 - 86)  BP: 150/88 (02-01-24 @ 23:15) (121/59 - 150/88)  RR: 18 (02-01-24 @ 23:15) (17 - 18)  SpO2: 99% (02-01-24 @ 23:15) (96% - 99%)    Physical Examination:  General - NAD  Cardiovascular - Peripheral pulses palpable, no edema  Eyes -; Fundoscopy not performed due to safety precautions in the setting of the COVID-19 pandemic    Neurologic Exam:  Mental status - Awake, Alert, Oriented to person, place, and time. Speech fluent, repetition and naming intact. Follows simple and complex commands. Attention/concentration, recent and remote memory (including registration and recall), and fund of knowledge intact    Cranial nerves - PERRLA, VFF, EOMI, face sensation (V1-V3) intact b/l, facial strength intact without asymmetry b/l, hearing intact b/l, palate with symmetric elevation, trapezius  5/5 strength b/l, tongue midline on protrusion with full lateral movement    Motor - Normal bulk and tone throughout. No pronator drift.  Strength testing            Deltoid      Biceps      Triceps     Wrist Extension    Wrist Flexion     Interossei         R            5                 5               5                     5                              5                        5                 5  L             5                 5               5                     5                              5                        5                 5              Hip Flexion    Hip Extension    Knee Flexion    Knee Extension    Dorsiflexion    Plantar Flexion  R              5                           5                       5                           5                            5                          5  L              5                           5                        5                           5                            5                          5    Sensation - Light touch/temperature intact throughout    DTR's -             Biceps      Triceps     Brachioradialis      Patellar    Ankle    Toes/plantar response  R             2+             2+                  2+                       2+            2+                 Down  L              2+             2+                 2+                        2+           2+                 Down    Coordination - Finger to Nose intact b/l. No tremors appreciated    Gait and station - Normal casual gait. Romberg (-)    Labs:                        14.3   10.06 )-----------( 257      ( 01 Feb 2024 21:30 )             43.7     02-01    142  |  104  |  17  ----------------------------<  90  4.3   |  24  |  0.70    Ca    8.9      01 Feb 2024 21:30  Phos  2.8     02-01  Mg     2.20     02-01    TPro  7.5  /  Alb  3.9  /  TBili  0.2  /  DBili  x   /  AST  40  /  ALT  36  /  AlkPhos  81  02-01    CAPILLARY BLOOD GLUCOSE      POCT Blood Glucose.: 101 mg/dL (01 Feb 2024 20:09)    LIVER FUNCTIONS - ( 01 Feb 2024 21:30 )  Alb: 3.9 g/dL / Pro: 7.5 g/dL / ALK PHOS: 81 U/L / ALT: 36 U/L / AST: 40 U/L / GGT: x               CSF:                  Radiology:     Neurology - Consult Note    -  Spectra: [phone] ([facility]), [phone] ([facility])  -    HPI: A 53 male with PMH of epilepsy (since childhood) on keppra 1 g AM/1.5 g PM and carbamazepine 300 mg BID presented with multiple seizure episodes today. Patient has been well controlled on his regiment for the past 20 years;  On 01/27/24; patient reports he had a seizure episode which involves generalized body shakes. Earlier this morning; patient last took seizure medications at 4am-5am; and around 5 AM patient reports he was coughing and had an episode where he fell from standing (no LOC), remembered he hit his head and had a laceration of his left scalp and a hematoma of his left forehead, he denies having a seizure at that time. After patient arrived home from work, he experienced another seizure episode, this time witnessed by his wife who has seen prior seizures before and had another one while they were driving to the hospital. These episodes lasted less than 5 minutes without any intervention. With his episodes, he had rapid blinking but no shaking for less than 5 minutes and after patient felt extremely fatigued, but was not disoriented/confused.  Last Saturday, he had another episode but in addition to the blinking he also had whole body shaking. For the past 3 weeks, patient has been endorsing a cough of unknown origin for the past 3 weeks which has interfered with his sleep. He has had worsening fatigue with this cough which has been a trigger for his seizures in the past.     Per primary team who spoke to his neurologist (Dr. Randolph Bucio (Cell: [phone], Office [phone]), patient had a hospitalization last year for carbamazepine toxicity at [facility] (CBZ level 16.8) when he was on Carbamazepine 400 BID; the regiment which he was stable for 20 years. He had taken an extra dose of his carbamazepine at that time, but out of caution. He was discharged from the hospital on  mg BID. His neurologist recommended possibly returning to his 400 BID dosing after workup during this admission.     Review of Systems:  CONSTITUTIONAL: No fevers or chills  CARDIOVASCULAR: No chest pain or palpitations  NEUROLOGICAL: +As stated in HPI above  SKIN: No itching, burning, rashes, or lesions   All other review of systems is negative unless indicated above.    Allergies:  No Known Allergies      PMHx/PSHx/Family Hx: As above, otherwise see below   Hypertension    Epilepsy        Social Hx:  No current use of tobacco, alcohol, or illicit drugs    Medications:  MEDICATIONS  (STANDING):  carBAMazepine ER Capsule 300 milliGRAM(s) Oral two times a day  levETIRAcetam 1000 milliGRAM(s) Oral <User Schedule>    MEDICATIONS  (PRN):      Vitals:  T(C): 36.7 (02-01-24 @ 23:15), Max: 36.7 (02-01-24 @ 20:09)  HR: 75 (02-01-24 @ 23:15) (75 - 86)  BP: 150/88 (02-01-24 @ 23:15) (121/59 - 150/88)  RR: 18 (02-01-24 @ 23:15) (17 - 18)  SpO2: 99% (02-01-24 @ 23:15) (96% - 99%)    Physical Examination:  General - NAD  Cardiovascular - Peripheral pulses palpable, no edema  Eyes -; Fundoscopy not performed due to safety precautions in the setting of the COVID-19 pandemic    Neurologic Exam:  Mental status - Awake, Alert, Oriented to person, place, and time. Speech fluent, repetition and naming intact. Follows simple and complex commands. Attention/concentration, recent and remote memory (including registration and recall), and fund of knowledge intact    Cranial nerves - PERRLA, VFF, EOMI, face sensation (V1-V3) intact b/l, facial strength intact without asymmetry b/l, hearing intact b/l, palate with symmetric elevation, trapezius  5/5 strength b/l, tongue midline on protrusion with full lateral movement    Motor - Normal bulk and tone throughout. No pronator drift.  Strength testing            Deltoid      Biceps      Triceps     Wrist Extension    Wrist Flexion     Interossei         R            5                 5               5                     5                              5                        5                 5  L             5                 5               5                     5                              5                        5                 5              Hip Flexion    Hip Extension    Knee Flexion    Knee Extension    Dorsiflexion    Plantar Flexion  R              5                           5                       5                           5                            5                          5  L              5                           5                        5                           5                            5                          5    Sensation - Light touch/temperature intact throughout    DTR's -             Biceps      Triceps     Brachioradialis      Patellar    Ankle    Toes/plantar response  R             2+             2+                  2+                       2+            2+                 Down  L              2+             2+                 2+                        2+           2+                 Down    Coordination - Finger to Nose intact b/l. No tremors appreciated    Gait and station - Normal casual gait. Romberg (-)    Labs:                        14.3   10.06 )-----------( 257      ( 01 Feb 2024 21:30 )             43.7     02-01    142  |  104  |  17  ----------------------------<  90  4.3   |  24  |  0.70    Ca    8.9      01 Feb 2024 21:30  Phos  2.8     02-01  Mg     2.20     02-01    TPro  7.5  /  Alb  3.9  /  TBili  0.2  /  DBili  x   /  AST  40  /  ALT  36  /  AlkPhos  81  02-01    CAPILLARY BLOOD GLUCOSE      POCT Blood Glucose.: 101 mg/dL (01 Feb 2024 20:09)    LIVER FUNCTIONS - ( 01 Feb 2024 21:30 )  Alb: 3.9 g/dL / Pro: 7.5 g/dL / ALK PHOS: 81 U/L / ALT: 36 U/L / AST: 40 U/L / GGT: x               CSF:                  Radiology:     Neurology - Consult Note    -  Spectra: [phone] ([facility]), [phone] ([facility])  -    HPI: A 53 male with PMH of epilepsy (since childhood) on keppra 1 g AM/1.5 g PM and carbamazepine 300 mg BID presented with multiple seizure episodes today. Patient has been well controlled on his regiment for the past 20 years;  On 01/27/24; patient reports he had a seizure episode which involves generalized body shakes. Earlier this morning; patient last took seizure medications at 4am-5am; and around 5 AM patient reports he was coughing and had an episode where he fell from standing (no LOC), remembered he hit his head and had a laceration of his left scalp and a hematoma of his left forehead, he denies having a seizure at that time. After patient arrived home from work, he experienced another seizure episode, this time witnessed by his wife who has seen prior seizures before and had another one while they were driving to the hospital. These episodes lasted less than 5 minutes without any intervention. With his episodes, he had rapid blinking but no shaking for less than 5 minutes and after patient felt extremely fatigued, but was not disoriented/confused.  Last Saturday, he had another episode but in addition to the blinking he also had whole body shaking. He remembers his episodes. For the past 3 weeks, patient has been endorsing a cough of unknown origin for the past 3 weeks which has interfered with his sleep. He has had worsening fatigue with this cough which has been a trigger for his seizures in the past.     Per primary team who spoke to his neurologist (Dr. Randolph Bucio (Cell: [phone], Office [phone]), patient had a hospitalization last year for carbamazepine toxicity at [facility] (CBZ level 16.8) when he was on Carbamazepine 400 BID; the regiment which he was stable for 20 years. He had taken an extra dose of his carbamazepine at that time, but out of caution. He was discharged from the hospital on  mg BID. His neurologist recommended possibly returning to his 400 BID dosing after workup during this admission.     Review of Systems:  CONSTITUTIONAL: No fevers or chills  CARDIOVASCULAR: No chest pain or palpitations  NEUROLOGICAL: +As stated in HPI above  SKIN: No itching, burning, rashes, or lesions   All other review of systems is negative unless indicated above.    Allergies:  No Known Allergies      PMHx/PSHx/Family Hx: As above, otherwise see below   Hypertension    Epilepsy        Social Hx:  No current use of tobacco, alcohol, or illicit drugs    Medications:  MEDICATIONS  (STANDING):  carBAMazepine ER Capsule 300 milliGRAM(s) Oral two times a day  levETIRAcetam 1000 milliGRAM(s) Oral <User Schedule>    MEDICATIONS  (PRN):      Vitals:  T(C): 36.7 (02-01-24 @ 23:15), Max: 36.7 (02-01-24 @ 20:09)  HR: 75 (02-01-24 @ 23:15) (75 - 86)  BP: 150/88 (02-01-24 @ 23:15) (121/59 - 150/88)  RR: 18 (02-01-24 @ 23:15) (17 - 18)  SpO2: 99% (02-01-24 @ 23:15) (96% - 99%)    Physical Examination:  General - NAD  Cardiovascular - Peripheral pulses palpable, no edema  Eyes -; Fundoscopy not performed due to safety precautions in the setting of the COVID-19 pandemic    Neurologic Exam:  Mental status - Awake, Alert, Oriented to person, place, and time. Speech fluent, repetition and naming intact. Follows simple and complex commands. Attention/concentration, recent and remote memory (including registration and recall), and fund of knowledge intact    Cranial nerves - PERRLA, VFF, EOMI, face sensation (V1-V3) intact b/l, facial strength intact without asymmetry b/l, hearing intact b/l, palate with symmetric elevation, trapezius  5/5 strength b/l, tongue midline on protrusion with full lateral movement    Motor - Normal bulk and tone throughout. No pronator drift.  Strength testing            Deltoid      Biceps      Triceps     Wrist Extension    Wrist Flexion     Interossei         R            5                 5               5                     5                              5                        5                 5  L             5                 5               5                     5                              5                        5                 5              Hip Flexion    Hip Extension    Knee Flexion    Knee Extension    Dorsiflexion    Plantar Flexion  R              5                           5                       5                           5                            5                          5  L              5                           5                        5                           5                            5                          5    Sensation - Light touch/temperature intact throughout    DTR's -             Biceps      Triceps     Brachioradialis      Patellar    Ankle    Toes/plantar response  R             2+             2+                  2+                       2+            2+                 Down  L              2+             2+                 2+                        2+           2+                 Down    Coordination - Finger to Nose intact b/l. No tremors appreciated    Gait and station - Normal casual gait. Romberg (-)    Labs:                        14.3   10.06 )-----------( 257      ( 01 Feb 2024 21:30 )             43.7     02-01    142  |  104  |  17  ----------------------------<  90  4.3   |  24  |  0.70    Ca    8.9      01 Feb 2024 21:30  Phos  2.8     02-01  Mg     2.20     02-01    TPro  7.5  /  Alb  3.9  /  TBili  0.2  /  DBili  x   /  AST  40  /  ALT  36  /  AlkPhos  81  02-01    CAPILLARY BLOOD GLUCOSE      POCT Blood Glucose.: 101 mg/dL (01 Feb 2024 20:09)    LIVER FUNCTIONS - ( 01 Feb 2024 21:30 )  Alb: 3.9 g/dL / Pro: 7.5 g/dL / ALK PHOS: 81 U/L / ALT: 36 U/L / AST: 40 U/L / GGT: x               CSF:                  Radiology:     Neurology - Consult Note    -  Spectra: [phone] ([facility]), [phone] ([facility])  -    HPI: A 53 male with PMH of epilepsy (since childhood) on keppra 1 g AM/1.5 g PM and carbamazepine 300 mg BID presented with multiple seizure episodes today. Patient has been well controlled on his regiment for the past 20 years;  On 01/27/24; patient reports he had a seizure episode which involves generalized body shakes. Earlier this morning; patient last took seizure medications at 4am-5am; and around 5 AM patient reports he was coughing and had an episode where he fell from standing (no LOC), remembered he hit his head and had a laceration of his left scalp and a hematoma of his left forehead, he denies having a seizure at that time. After patient arrived home from work, he experienced another seizure episode, this time witnessed by his wife who has seen prior seizures before and had another one while they were driving to the hospital. These episodes lasted less than 5 minutes without any intervention. With his episodes, he had rapid blinking but no shaking for less than 5 minutes and after patient felt extremely fatigued, but was not disoriented/confused.  Last Saturday, he had another episode but in addition to the blinking he also had whole body shaking. He remembers his episodes. For the past 3 weeks, patient has been endorsing a cough of unknown origin for the past 3 weeks which has interfered with his sleep. He has had worsening fatigue with this cough which has been a trigger for his seizures in the past.     Per primary team who spoke to his neurologist (Dr. Randolph Bucio (Cell: [phone], Office [phone]), patient had a hospitalization last year for carbamazepine toxicity at [facility] (CBZ level 16.8) when he was on Carbamazepine 400 BID; the regiment which he was stable for 20 years. He had taken an extra dose of his carbamazepine at that time, but out of caution. He was discharged from the hospital on  mg BID. His neurologist recommended possibly returning to his 400 BID dosing after workup during this admission.     Review of Systems:  CONSTITUTIONAL: No fevers or chills  CARDIOVASCULAR: No chest pain or palpitations  NEUROLOGICAL: +As stated in HPI above  SKIN: No itching, burning, rashes, or lesions   All other review of systems is negative unless indicated above.    Allergies:  No Known Allergies      PMHx/PSHx/Family Hx: As above, otherwise see below   Hypertension    Epilepsy        Social Hx:  No current use of tobacco, alcohol, or illicit drugs    Medications:  MEDICATIONS  (STANDING):  carBAMazepine ER Capsule 300 milliGRAM(s) Oral two times a day  levETIRAcetam 1000 milliGRAM(s) Oral <User Schedule>    MEDICATIONS  (PRN):      Vitals:  T(C): 36.7 (02-01-24 @ 23:15), Max: 36.7 (02-01-24 @ 20:09)  HR: 75 (02-01-24 @ 23:15) (75 - 86)  BP: 150/88 (02-01-24 @ 23:15) (121/59 - 150/88)  RR: 18 (02-01-24 @ 23:15) (17 - 18)  SpO2: 99% (02-01-24 @ 23:15) (96% - 99%)    Physical Examination:  General - NAD  Cardiovascular - Peripheral pulses palpable, no edema  Eyes -; Fundoscopy not performed due to safety precautions in the setting of the COVID-19 pandemic    Neurologic Exam:  Mental status - Awake, Alert, Oriented to person, place, and time. Speech fluent, repetition and naming intact. Follows simple and complex commands. Attention/concentration, recent and remote memory (including registration and recall), and fund of knowledge intact    Cranial nerves - PERRLA, VFF, EOMI, face sensation (V1-V3) intact b/l, facial strength intact without asymmetry b/l, hearing intact b/l, palate with symmetric elevation, trapezius  5/5 strength b/l, tongue midline on protrusion with full lateral movement    Motor - Normal bulk and tone throughout. No pronator drift.  Strength testing            Deltoid      Biceps      Triceps     Wrist Extension    Wrist Flexion     Interossei         R            5                 5               5                     5                              5                        5                 5  L             5                 5               5                     5                              5                        5                 5              Hip Flexion    Hip Extension    Knee Flexion    Knee Extension    Dorsiflexion    Plantar Flexion  R              5                           5                       5                           5                            5                          5  L              5                           5                        5                           5                            5                          5    Sensation - Light touch/temperature intact throughout    DTR's -             Biceps      Triceps     Brachioradialis      Patellar    Ankle    Toes/plantar response  R             2+             2+                  2+                       2+            2+                 Down  L              2+             2+                 2+                        2+           2+                 Down    Coordination - Finger to Nose intact b/l. No tremors appreciated    Gait and station - Normal casual gait. Romberg (-)    Labs:                        14.3   10.06 )-----------( 257      ( 01 Feb 2024 21:30 )             43.7     02-01    142  |  104  |  17  ----------------------------<  90  4.3   |  24  |  0.70    Ca    8.9      01 Feb 2024 21:30  Phos  2.8     02-01  Mg     2.20     02-01    TPro  7.5  /  Alb  3.9  /  TBili  0.2  /  DBili  x   /  AST  40  /  ALT  36  /  AlkPhos  81  02-01    CAPILLARY BLOOD GLUCOSE      POCT Blood Glucose.: 101 mg/dL (01 Feb 2024 20:09)    LIVER FUNCTIONS - ( 01 Feb 2024 21:30 )  Alb: 3.9 g/dL / Pro: 7.5 g/dL / ALK PHOS: 81 U/L / ALT: 36 U/L / AST: 40 U/L / GGT: x               CSF:                  Radiology:  FINDINGS:  There is no CT evidence of acute intracranial hemorrhage, mass effect or midline shift. Gray matter-white matter differentiation is grossly preserved.    The ventricles, sulci and extra-axial spaces appear within normal limits.    The visualized paranasal sinuses are clear.    Well-developed mastoids are clear bilaterally.    Left frontal scalp contusion without an underlying skull fracture.    The calvarium, skull base, and orbits appear within normal limits.    IMPRESSION:  Left frontal scalp contusion without an underlying skull fracture, and without acute intracranial abnormality detected.

## 2024-02-01 NOTE — CONSULT NOTE ADULT - ATTENDING COMMENTS
Mr. Matute is a 52 yo man with a h/o epilepsy with uncontrolled seizures after decreasing CBZ.  Can D/C home with antiseizure medication dosing as above.   No benefit for EEG.  His wife can call his neurologist with any issues.  I D/W his neurologist and the patient and they agree.  Make sure he recieves levetiracetam 1500 mg and  mg prior to D/C home.   D/W neurology resident who d/w primary team.   Neurology signing off. Please reconsult PRN or call [facility] [phone] with any questions.   Thank you

## 2024-02-02 ENCOUNTER — TRANSCRIPTION ENCOUNTER (OUTPATIENT)
Age: 54
End: 2024-02-02

## 2024-02-02 VITALS — SYSTOLIC BLOOD PRESSURE: 105 MMHG | DIASTOLIC BLOOD PRESSURE: 62 MMHG

## 2024-02-02 DIAGNOSIS — R55 SYNCOPE AND COLLAPSE: ICD-10-CM

## 2024-02-02 DIAGNOSIS — R05.3 CHRONIC COUGH: ICD-10-CM

## 2024-02-02 DIAGNOSIS — Z29.9 ENCOUNTER FOR PROPHYLACTIC MEASURES, UNSPECIFIED: ICD-10-CM

## 2024-02-02 DIAGNOSIS — R56.9 UNSPECIFIED CONVULSIONS: ICD-10-CM

## 2024-02-02 DIAGNOSIS — I10 ESSENTIAL (PRIMARY) HYPERTENSION: ICD-10-CM

## 2024-02-02 PROBLEM — G40.909 EPILEPSY, UNSPECIFIED, NOT INTRACTABLE, WITHOUT STATUS EPILEPTICUS: Chronic | Status: ACTIVE | Noted: 2023-02-28

## 2024-02-02 LAB
APPEARANCE UR: CLEAR — SIGNIFICANT CHANGE UP
BILIRUB UR-MCNC: NEGATIVE — SIGNIFICANT CHANGE UP
CK SERPL-CCNC: 645 U/L — HIGH (ref 30–200)
COLOR SPEC: YELLOW — SIGNIFICANT CHANGE UP
DIFF PNL FLD: NEGATIVE — SIGNIFICANT CHANGE UP
ETHANOL SERPL-MCNC: <10 MG/DL — SIGNIFICANT CHANGE UP
GLUCOSE UR QL: NEGATIVE MG/DL — SIGNIFICANT CHANGE UP
KETONES UR-MCNC: NEGATIVE MG/DL — SIGNIFICANT CHANGE UP
LACTATE SERPL-SCNC: 1.3 MMOL/L — SIGNIFICANT CHANGE UP (ref 0.5–2)
LEUKOCYTE ESTERASE UR-ACNC: NEGATIVE — SIGNIFICANT CHANGE UP
NITRITE UR-MCNC: NEGATIVE — SIGNIFICANT CHANGE UP
PH UR: 7 — SIGNIFICANT CHANGE UP (ref 5–8)
PROT UR-MCNC: NEGATIVE MG/DL — SIGNIFICANT CHANGE UP
SP GR SPEC: 1.02 — SIGNIFICANT CHANGE UP (ref 1–1.03)
UROBILINOGEN FLD QL: 0.2 MG/DL — SIGNIFICANT CHANGE UP (ref 0.2–1)

## 2024-02-02 PROCEDURE — 99223 1ST HOSP IP/OBS HIGH 75: CPT

## 2024-02-02 PROCEDURE — 70450 CT HEAD/BRAIN W/O DYE: CPT | Mod: 26,MB

## 2024-02-02 PROCEDURE — 99221 1ST HOSP IP/OBS SF/LOW 40: CPT

## 2024-02-02 RX ORDER — CARBAMAZEPINE 200 MG
400 TABLET ORAL
Refills: 0 | Status: DISCONTINUED | OUTPATIENT
Start: 2024-02-02 | End: 2024-02-02

## 2024-02-02 RX ORDER — CARBAMAZEPINE 200 MG
100 TABLET ORAL ONCE
Refills: 0 | Status: COMPLETED | OUTPATIENT
Start: 2024-02-02 | End: 2024-02-02

## 2024-02-02 RX ORDER — CARBAMAZEPINE 200 MG
1 TABLET ORAL
Refills: 0 | DISCHARGE

## 2024-02-02 RX ORDER — CARBAMAZEPINE 200 MG
2 TABLET ORAL
Qty: 120 | Refills: 0
Start: 2024-02-02 | End: 2024-03-02

## 2024-02-02 RX ORDER — AMLODIPINE BESYLATE 2.5 MG/1
2.5 TABLET ORAL DAILY
Refills: 0 | Status: DISCONTINUED | OUTPATIENT
Start: 2024-02-02 | End: 2024-02-02

## 2024-02-02 RX ORDER — ENOXAPARIN SODIUM 100 MG/ML
40 INJECTION SUBCUTANEOUS EVERY 24 HOURS
Refills: 0 | Status: DISCONTINUED | OUTPATIENT
Start: 2024-02-02 | End: 2024-02-02

## 2024-02-02 RX ORDER — INFLUENZA VIRUS VACCINE 15; 15; 15; 15 UG/.5ML; UG/.5ML; UG/.5ML; UG/.5ML
0.5 SUSPENSION INTRAMUSCULAR ONCE
Refills: 0 | Status: DISCONTINUED | OUTPATIENT
Start: 2024-02-02 | End: 2024-02-02

## 2024-02-02 RX ORDER — LISINOPRIL 2.5 MG/1
40 TABLET ORAL DAILY
Refills: 0 | Status: DISCONTINUED | OUTPATIENT
Start: 2024-02-02 | End: 2024-02-02

## 2024-02-02 RX ADMIN — Medication 400 MILLIGRAM(S): at 07:34

## 2024-02-02 RX ADMIN — Medication 600 MILLIGRAM(S): at 12:47

## 2024-02-02 RX ADMIN — Medication 100 MILLIGRAM(S): at 02:36

## 2024-02-02 RX ADMIN — ENOXAPARIN SODIUM 40 MILLIGRAM(S): 100 INJECTION SUBCUTANEOUS at 12:47

## 2024-02-02 RX ADMIN — LEVETIRACETAM 1000 MILLIGRAM(S): 250 TABLET, FILM COATED ORAL at 05:42

## 2024-02-02 RX ADMIN — LEVETIRACETAM 1500 MILLIGRAM(S): 250 TABLET, FILM COATED ORAL at 18:02

## 2024-02-02 NOTE — DISCHARGE NOTE PROVIDER - NSDCFUADDINST_GEN_ALL_CORE_FT
Do not drive, operate heavy machinery, avoid heights, pools, bathtubs, locked doors until cleared by your neurologist Do not drive, operate heavy machinery, avoid heights, pools, bathtubs, locked doors until cleared by your neurologist.  If you have any new or worsening symptoms, contact your physician or return to the hospital.

## 2024-02-02 NOTE — ED ADULT NURSE REASSESSMENT NOTE - NS ED NURSE REASSESS COMMENT FT1
At approximately 0630, pt communicated to MD Albarran that he wants to leave the hospital. LUIS FERNANDO Jansen made aware, informed author she will come down to speak with the pt and re-evaluate him. Pt given update about the process.
MD Ochoa at pt bedside speaking with patient in regards to medical care and impending discharge. Pt in NAD, Will continue to monitor.
Provider currently at bedside speaking with patient, will await orders and continue to monitor.
Pt a&ox4, ambulatory, denies CP, SOB, or dyspnea at this time. Airway is patent, speaking in clear and coherent sentences.

## 2024-02-02 NOTE — H&P ADULT - PROBLEM SELECTOR PLAN 3
Pt with a persistent, mainly productive cough for the past 4 weeks, with pt having severe coughing fits regularly during overnight hours or spontaneously during the day in which feels like he is choking on his phlegm and becomes short of breath. Refractory to Robitussin DM. Pt with no other associated symptoms.  - Check RVP  - CT chest noncontrast ordered to evaluate airways and lung parenchyma  - Hold any additional dextromethorphan given concern for DM overdose as cause of near syncope/syncope episode  - Start Mucinex  mg q12hrs x3 days standing Pt with a persistent, mainly productive cough for the past 4 weeks, with pt having severe coughing fits regularly during overnight hours or spontaneously during the day in which feels like he is choking on his phlegm and becomes short of breath. Refractory to Robitussin DM. Pt with no other associated symptoms. Unlikely to be in setting of lisinopril use, as pt has been on lisinopril for years.  - Check RVP  - CT chest noncontrast ordered to evaluate airways and lung parenchyma  - Hold any additional dextromethorphan given concern for DM overdose as cause of near syncope/syncope episode  - Start Mucinex  mg q12hrs x3 days standing Pt with a persistent, mainly productive cough for the past 4 weeks, with pt having severe coughing fits regularly during overnight hours or spontaneously during the day in which feels like he is choking on his phlegm and becomes short of breath. Refractory to Robitussin DM. Pt with no other associated symptoms. Unlikely to be in setting of lisinopril use, as pt has been on lisinopril for years and given mainly productive nature of cough.  - Check RVP  - CT chest noncontrast ordered to evaluate airways and lung parenchyma  - Hold any additional dextromethorphan given concern for DM overdose as cause of near syncope/syncope episode  - Start Mucinex  mg q12hrs x3 days standing

## 2024-02-02 NOTE — H&P ADULT - NSHPLABSRESULTS_GEN_ALL_CORE
EKG personally reviewed.    Labs personally reviewed.                        14.3   10.06 )-----------( 257      ( 01 Feb 2024 21:30 )             43.7     02-01    142  |  104  |  17  ----------------------------<  90  4.3   |  24  |  0.70    Ca    8.9      01 Feb 2024 21:30  Phos  2.8     02-01  Mg     2.20     02-01    TPro  7.5  /  Alb  3.9  /  TBili  0.2  /  DBili  x   /  AST  40  /  ALT  36  /  AlkPhos  81  02-01     (mildly hemolyzed)    Blood alcohol level negative    Imaging personally reviewed.  ACC: [id number] EXAM:  CT BRAIN   ORDERED BY: TOMI ELIZONDO   PROCEDURE DATE:  02/02/2024    FINDINGS:  There is no CT evidence of acute intracranial hemorrhage, mass effect or   midline shift.  Gray matter-white matter differentiation is grossly   preserved.    The ventricles, sulci andextra-axial spaces appear within normal limits.    The visualized paranasal sinuses are clear.    Well-developed mastoids are clear bilaterally.    Left frontal scalp contusion without an underlying skull fracture.    The calvarium, skull base, and orbits appear within normal limits.    IMPRESSION:  Left frontal scalp contusion without an underlying skull fracture, and   without acute intracranial abnormality detected.    ACC: [id number] EXAM:  XR HAND 2 VIEWS RT   ORDERED BY: TOMI ELIZONDO   ACC: [id number] EXAM:  XR WRIST 2 VIEWS RT   ORDERED BY: TOMI ELIZONDO   PROCEDURE DATE:  02/01/2024    ******PRELIMINARY REPORT******    ******PRELIMINARY REPORT******   FINDINGS: No acute fracture or dislocation. Mild narrowing of the   radiocarpal interface with radial articular sclerosis, chronic appearing.   No significant soft tissue injury.    IMPRESSION: No acute fracture or dislocation.    ACC: [id number] EXAM:  XR CHEST PA LAT 2V   ORDERED BY: TOMI ELIZONDO   PROCEDURE DATE:  02/01/2024    FINDINGS:  The heart is normal in size.  The lungs are clear.  There is no pneumothorax or pleural effusion.    IMPRESSION:  Clear lungs. EKG personally reviewed.  NSR at 81 bpm, LVH, no acute ischemic changes, QTc 471 ms.     Labs personally reviewed.                        14.3   10.06 )-----------( 257      ( 01 Feb 2024 21:30 )             43.7     02-01    142  |  104  |  17  ----------------------------<  90  4.3   |  24  |  0.70    Ca    8.9      01 Feb 2024 21:30  Phos  2.8     02-01  Mg     2.20     02-01    TPro  7.5  /  Alb  3.9  /  TBili  0.2  /  DBili  x   /  AST  40  /  ALT  36  /  AlkPhos  81  02-01     (mildly hemolyzed)    Blood alcohol level negative    Imaging personally reviewed.  ACC: [id number] EXAM:  CT BRAIN   ORDERED BY: TOMI ELIZONDO   PROCEDURE DATE:  02/02/2024    FINDINGS:  There is no CT evidence of acute intracranial hemorrhage, mass effect or   midline shift.  Gray matter-white matter differentiation is grossly   preserved.    The ventricles, sulci andextra-axial spaces appear within normal limits.    The visualized paranasal sinuses are clear.    Well-developed mastoids are clear bilaterally.    Left frontal scalp contusion without an underlying skull fracture.    The calvarium, skull base, and orbits appear within normal limits.    IMPRESSION:  Left frontal scalp contusion without an underlying skull fracture, and   without acute intracranial abnormality detected.    ACC: [id number] EXAM:  XR HAND 2 VIEWS RT   ORDERED BY: TOMI ELIZONDO   ACC: [id number] EXAM:  XR WRIST 2 VIEWS RT   ORDERED BY: TOMI ELIZONDO   PROCEDURE DATE:  02/01/2024    ******PRELIMINARY REPORT******    ******PRELIMINARY REPORT******   FINDINGS: No acute fracture or dislocation. Mild narrowing of the   radiocarpal interface with radial articular sclerosis, chronic appearing.   No significant soft tissue injury.    IMPRESSION: No acute fracture or dislocation.    ACC: [id number] EXAM:  XR CHEST PA LAT 2V   ORDERED BY: TOMI ELIZONDO   PROCEDURE DATE:  02/01/2024    FINDINGS:  The heart is normal in size.  The lungs are clear.  There is no pneumothorax or pleural effusion.    IMPRESSION:  Clear lungs.

## 2024-02-02 NOTE — PATIENT PROFILE ADULT - FALL HARM RISK - HARM RISK INTERVENTIONS

## 2024-02-02 NOTE — H&P ADULT - PROBLEM SELECTOR PLAN 2
Pt with at least 2 seizures on Thursday witnessed by pt's wife and another seizure this past Saturday. Concern for breakthrough seizures possibly triggered by an infection given refractory cough.  - Neurology consulted, appreciate recs  - AED recommendations: Keppra 1 g AM and 1.5 g PM and increase carbamazepine to 400 mg BID  - 24-hr vEEG ordered  - Check CK and lactate  - Urine tox screen ordered. Blood alcohol level negative.   - Infectious workup: UA negative, CXR clear lungs. Check urine cx, blood cxs, and RVP.  - Advise pt not to drive, operate heavy machinery, avoid heights, pools, bathtubs, locked doors.   - Administer  2 mg IV Ativan STAT for seizure activity or GTC > 3 minutes WITH significant derangement of vital signs only  - Administer 1500 mg IV Keppra over 15 minutes for seizures refractory to Ativan/Valium  - Aspiration precautions, fall risk protocol, and seizure precautions

## 2024-02-02 NOTE — DISCHARGE NOTE PROVIDER - NSDCCPCAREPLAN_GEN_ALL_CORE_FT
PRINCIPAL DISCHARGE DIAGNOSIS  Diagnosis: Seizure  Assessment and Plan of Treatment: Follow up with your neurologist within one week. Continue keppra as prescribed and increase carbemazepine to 400mg BID. Return to the ED for any concerning symptoms.      SECONDARY DISCHARGE DIAGNOSES  Diagnosis: Laceration of skin of scalp  Assessment and Plan of Treatment: You had staples placed on your scalp for a laceration. Have these removed within 7-10 days. You can go to your PMDs office, an urgent care or the ED to have them removed. Return to the ED for any pain, drainage, or any other concerning symptoms.

## 2024-02-02 NOTE — DISCHARGE NOTE NURSING/CASE MANAGEMENT/SOCIAL WORK - NSDCFUADDAPPT_GEN_ALL_CORE_FT
Follow up with your Neurologist, Dr. Randolph Bucio, within 1 week of discharge.  Please call to schedule appointment.    Follow up with your primary care doctor within one week of discharge. If you do not have one, you can call the medicine clinic at [phone] to make an appointment.

## 2024-02-02 NOTE — H&P ADULT - ASSESSMENT
54 yo man with history of epilepsy (since childhood, on Keppra and carbamazepine) and HTN presents with near syncope and multiple seizure episodes on Thursday, admitted for further workup.

## 2024-02-02 NOTE — H&P ADULT - NSHPPHYSICALEXAM_GEN_ALL_CORE
Vital Signs Last 24 Hrs  T(C): 36.9 (02 Feb 2024 07:31), Max: 36.9 (02 Feb 2024 07:31)  T(F): 98.4 (02 Feb 2024 07:31), Max: 98.4 (02 Feb 2024 07:31)  HR: 87 (02 Feb 2024 07:31) (75 - 87)  BP: 159/88 (02 Feb 2024 07:31) (121/59 - 159/88)  BP(mean): --  RR: 16 (02 Feb 2024 07:31) (16 - 18)  SpO2: 97% (02 Feb 2024 07:31) (96% - 99%)    PHYSICAL EXAM:  General: Awake and alert.  No acute distress.  Head: Normocephalic. ~5 cm L sided scalp laceration with staples in place, no bleeding.  L sided forehead abrasion.    Eyes: PERRL.  EOMI.  No scleral icterus.  No conjunctival pallor.  Mouth: No tongue fasciculations.  Moist MM.  No oropharyngeal exudates.    Neck: Supple.  Full range of motion.  No JVD.  No LAD.  No thyromegaly.  Trachea midline.    Heart: RRR.  Normal S1 and S2.  No murmurs, rubs, or gallops.  No LE edema b/l.   Lungs: Nonlabored breathing.  Good inspiratory effort.  CTAB.  No wheezes, crackles, or rhonchi.    Abdomen: BS+, soft, nontender with no rebound or guarding, nondistended.  No hepatomegaly.   Skin: Warm and dry.  No rashes.  Extremities: No cyanosis.  2+ peripheral pulses b/l.  No UE tremors.   Musculoskeletal: No joint deformities.  No spinal or paraspinal tenderness.  Neuro: A&Ox3.  CN II-XII intact.  5/5 motor strength in UE and LE b/l.  Tactile sensation intact in UE and LE b/l.  Cerebellar function intact as assessed by finger-to-nose test.  No focal deficits.

## 2024-02-02 NOTE — H&P ADULT - PROBLEM SELECTOR PLAN 1
Pt with sudden fall from standing position while at work on Thursday afternoon, preceded by increasing drowsiness after pt took an extra dose of Robitussin DM instead of the recommended 2 doses. Pt denies any LOC but had head strike. Concern for possible near syncope vs. syncope 2/2 Robitussin DM overdose, cough-associated syncope, orthostatic hypotension, vasovagal/reflex-mediated syncope, or cardiac etiology. CTH noncontrast with L frontal scalp contusion without an underlying skull fracture, no other acute findings.   - Hold any additional dextromethorphan given concern for DM overdose as cause of near syncope/syncope episode  - Check orthostatics  - TTE ordered to evaluate for structural heart disease  - Monitor on tele for arrhythmias  - Fall risk protocol

## 2024-02-02 NOTE — DISCHARGE NOTE PROVIDER - ATTENDING DISCHARGE PHYSICAL EXAMINATION:
T(C): 36.6 (02-02-24 @ 09:39), Max: 36.9 (02-02-24 @ 07:31)  HR: 85 (02-02-24 @ 09:39) (75 - 87)  BP: 141/91 (02-02-24 @ 09:39) (121/59 - 159/88)  RR: 18 (02-02-24 @ 09:39) (16 - 18)  SpO2: 95% (02-02-24 @ 09:39) (95% - 99%)    CONSTITUTIONAL: Well groomed, no apparent distress  EYES: PERRLA and symmetric, EOMI, No conjunctival or scleral injection, non-icteric  ENMT: Oral mucosa with moist membranes. Normal dentition; no pharyngeal injection or exudates             NECK: Supple, symmetric and without tracheal deviation   RESP: No respiratory distress, no use of accessory muscles; CTA b/l, no WRR  CV: RRR, +S1S2, no MRG; no JVD; no peripheral edema  GI: Soft, NT, ND, no rebound, no guarding; no palpable masses; no hepatosplenomegaly; no hernia palpated  MSK: No digital clubbing or cyanosis; examination of the (head/neck/spine/ribs/pelvis, RUE, LUE, RLE, LLE) without misalignment,            Normal ROM without pain, no spinal tenderness, normal muscle strength/tone  SKIN: No rashes or ulcers noted; no subcutaneous nodules or induration palpable; abrasion noted to right forehead; +staples noted to right medial scalp  NEURO: CN II-XII intact; no focal motor deficits, sensation intact in upper and lower extremities b/l to light touch   PSYCH: Appropriate insight/judgment; A+O x 3, mood and affect appropriate, recent/remote memory intact

## 2024-02-02 NOTE — DISCHARGE NOTE PROVIDER - NSDCFUSCHEDAPPT_GEN_ALL_CORE_FT
[facility]  [facility] [address] [facility]  Scheduled Appointment: 02/23/2024

## 2024-02-02 NOTE — H&P ADULT - NSHPSOCIALHISTORY_GEN_ALL_CORE
Denies any alcohol, tobacco, or illicit drug use.   . Lives at home with his wife.   Independent with ADLs and ambulation.

## 2024-02-02 NOTE — DISCHARGE NOTE PROVIDER - HOSPITAL COURSE
52 yo man with history of epilepsy (since childhood, on Keppra and carbamazepine) and HTN presents with near syncope and multiple seizure episodes on Thursday. Pt states that he has been dealing with a persistent, mainly productive cough for the past 4 weeks, with pt having severe coughing fits regularly during overnight hours or spontaneously during the day in which feels like he is choking on his phlegm and becomes short of breath. Pt went to urgent care at the start of his cough about a month ago and was told he likely has a sinus infection. Since that time, pt has been taking OTC Robitussin DM as needed, though without any improvement of the cough. 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 Afterwards, pt felt increasingly drowsy, then suddenly fell from standing position, striking his head on the ground. Pt denies losing consciousness, however fall was unwitnessed. Pt stayed for the rest of his shift despite sustaining a laceration to the left side of his scalp with bleeding. When pt returned home from work, he experienced a seizure witnessed by his wife. The episode lasted less than 5 minutes and pt was rapidly blinking without any shaking of his body. After the episode halted, pt felt extremely fatigued. Pt then had another episode, similar to the previous one, while on the way to the hospital. Pt reports that he also had a seizure this past Saturday in which he had rapid blinking but also whole-body shaking for ~5 minutes.     Of note, pt's epilepsy had been well controlled on a regimen of Keppra and carbamazepine 400 mg BID for 20 years. Last February, however, pt was admitted at [facility] for carbamazepine toxicity (CBZ level 16.8) after pt took an extra dose of his carbamazepine at that time, not intentionally but out of caution. He was discharged from the hospital on decreased dose of carbamazepine 300 mg BID. Pt had been doing well on this new regimen, without any breakthrough seizures, until this past Saturday when he had the rapid blinking and whole-body shaking.     Other than the cough, pt denies any chest pain, palpitations, shortness of breath when not having coughing fits, headaches, vision changes, fever, chills, abdominal pain, nausea, vomiting, diarrhea, constipation, melena, BRBPR, or urinary symptoms.     CT head was performed which showed no acute intracranial findings.     He was seen by neurology who recommended :  - Pt may be discharged home with close outpatient follow up with his neurologist in 1 week.   - 24 Hour EEG, can be done in out patient setting  - AED recommendations:  keppra 1 g AM/1.5 g PM and increase carbamazepine to 400 mg BID     Pt was seen and examined at bedside and is able to get close outpatient follow up with neurology and primary care. Stable for discharge home    Medication Changes:  [ ] Increase carbamazepine to 400mg BID    Follow up  [ ] neurology within one week  [ ] PMD within one week

## 2024-02-02 NOTE — DISCHARGE NOTE PROVIDER - NSDCFUADDAPPT_GEN_ALL_CORE_FT
Follow up with your Neurologist, Dr. Randolph Bucio, within 1 week of discharge.  Please call to schedule appointment.    Follow up with your primary care doctor within one week of discharge. If you do not have one, you can call the medicine clinic at [phone] to make an appointment.

## 2024-02-02 NOTE — DISCHARGE NOTE NURSING/CASE MANAGEMENT/SOCIAL WORK - NSDCPEFALRISK_GEN_ALL_CORE
For information on Fall & Injury Prevention, visit: [email or web address] OR  [email or web address] OR  https://www.cdc.gov/steadi/patient.html

## 2024-02-02 NOTE — DISCHARGE NOTE PROVIDER - CARE PROVIDER_API CALL
MARTA FAULKNER  [address] [address]  Phone: [phone]  Fax: [phone]  Follow Up Time:

## 2024-02-02 NOTE — DISCHARGE NOTE PROVIDER - NSDCMRMEDTOKEN_GEN_ALL_CORE_FT
amLODIPine 2.5 mg oral tablet: 1 tab(s) orally once a day  carBAMazepine 300 mg oral capsule, extended release: 1 cap(s) orally 2 times a day  Keppra 1000 mg oral tablet: 1000 milligram(s) orally in the morning (8AM)  1500 milligrams(s) orally in the evening (8PM)   lisinopril 40 mg oral tablet: 1 tab(s) orally once a day   amLODIPine 2.5 mg oral tablet: 1 tab(s) orally once a day  benzonatate 100 mg oral capsule: 1 cap(s) orally 3 times a day as needed for  cough  carBAMazepine 200 mg oral capsule, extended release: 2 cap(s) orally 2 times a day Take at 6 am and 6 pm  guaiFENesin 600 mg oral tablet, extended release: 1 tab(s) orally every 12 hours Take for 3 days  Keppra 1000 mg oral tablet: 1000 milligram(s) orally in the morning (8AM)  1500 milligrams(s) orally in the evening (8PM)   lisinopril 40 mg oral tablet: 1 tab(s) orally once a day

## 2024-02-02 NOTE — H&P ADULT - NSHPREVIEWOFSYSTEMS_GEN_ALL_CORE
Constitutional: No generalized weakness, fevers, chills, or weight loss  Eyes: No visual changes, double vision, or eye pain  Ears, Nose, Mouth, Throat: No runny nose, sinus pain, ear pain, tinnitus, sore throat, dysphagia, or odynophagia  Cardiovascular: No chest pain, palpitations, or LE edema  Respiratory: No cough, wheezing, hemoptysis, or shortness of breath  Gastrointestinal: No abdominal pain, dysphagia, anorexia, nausea/vomiting, diarrhea/constipation, hematemesis, melena, or BRBPR  Genitourinary: No dysuria, frequency, urgency, or hematuria  Musculoskeletal: No neck pain or back pain. No joint pain, swelling, or decreased ROM.  Skin: No pruritus, rashes, lesions, or wounds  Neurologic: No syncope, seizures, headache, paresthesias, numbness, or limb weakness  Psychiatric: No depression, anxiety, difficulty concentrating, anhedonia, or lack of energy  Endocrine: No heat/cold intolerance, mood swings, sweats, polydipsia, or polyuria  Hematologic/lymphatic: No purpura, petechia, or prolonged or excessive bleeding after dental extraction / injury  Allergic/Immunologic: No anaphylaxis or allergic response to materials, foods, animals    Positives and pertinent negatives noted and all other systems negative. Constitutional: No generalized weakness, fever, chills, or weight loss  Eyes: No visual changes, double vision, or eye pain  Ears, Nose, Mouth, Throat: No runny nose, sinus pain, ear pain, tinnitus, sore throat, dysphagia, or odynophagia  Cardiovascular: No chest pain, palpitations, or LE edema  Respiratory: +Persistent cough and shortness of breath only with coughing fits. No wheezing or hemoptysis.  Gastrointestinal: No abdominal pain, nausea/vomiting, diarrhea/constipation, hematemesis, melena, or BRBPR  Genitourinary: No dysuria, frequency, urgency, or hematuria  Musculoskeletal: No neck pain or back pain. No joint pain, swelling, or decreased ROM.  Skin: +L sided scalp laceration, L sided forehead abrasion. No pruritus or rashes.  Neurologic: +Near syncope and seizures. No headaches, paresthesias, numbness, or limb weakness.  Psychiatric: No depression, anxiety, difficulty concentrating, anhedonia, or lack of energy  Endocrine: No heat/cold intolerance, mood swings, sweats, polydipsia, or polyuria  Hematologic/lymphatic: No purpura, petechia, or prolonged or excessive bleeding after dental extraction / injury  Allergic/Immunologic: No anaphylaxis or allergic response to materials, foods, animals    Positives and pertinent negatives noted and all other systems negative.

## 2024-02-02 NOTE — H&P ADULT - HISTORY OF PRESENT ILLNESS
54 yo man with history of epilepsy (since childhood, on Keppra and carbamazepine) and HTN presents with multiple seizure episodes on Thursday. resented with multiple seizure episodes today. Patient has been well controlled on his regiment for the past 20 years;  On 01/27/24; patient reports he had a seizure episode which involves generalized body shakes. Earlier this morning; patient last took seizure medications at 4am-5am; and around 5 AM patient reports he was coughing and had an episode where he fell from standing (no LOC), remembered he hit his head and had a laceration of his left scalp and a hematoma of his left forehead, he denies having a seizure at that time. After patient arrived home from work, he experienced another seizure episode, this time witnessed by his wife who has seen prior seizures before and had another one while they were driving to the hospital. These episodes lasted less than 5 minutes without any intervention. With his episodes, he had rapid blinking but no shaking for less than 5 minutes and after patient felt extremely fatigued, but was not disoriented/confused.  Last Saturday, he had another episode but in addition to the blinking he also had whole body shaking. He remembers his episodes. For the past 3 weeks, patient has been endorsing a cough of unknown origin for the past 3 weeks which has interfered with his sleep. He has had worsening fatigue with this cough which has been a trigger for his seizures in the past.     Per primary team who spoke to his neurologist (Dr. Randolph Bucio (Cell: [phone], Office [phone]), patient had a hospitalization last year for carbamazepine toxicity at [facility] (CBZ level 16.8) when he was on Carbamazepine 400 BID; the regiment which he was stable for 20 years. He had taken an extra dose of his carbamazepine at that time, but out of caution. He was discharged from the hospital on  mg BID. His neurologist recommended possibly returning to his 400 BID dosing after workup during this admission.  54 yo man with history of epilepsy (since childhood, on Keppra and carbamazepine) and HTN presents with near syncope and multiple seizure episodes on Thursday. Pt states that he has been dealing with a persistent, mainly productive cough for the past 4 weeks, with pt having severe coughing fits regularly during overnight hours or spontaneously during the day in which feels like he is choking on his phlegm and becomes short of breath. Pt went to urgent care at the start of his cough about a month ago and was told he likely has a sinus infection. Since that time, pt has been taking OTC Robitussin DM as needed, though without any improvement of the cough. 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 Afterwards, pt felt increasingly drowsy, then suddenly fell from standing position, striking his head on the ground. Pt denies losing consciousness, however fall was unwitnessed. Pt stayed for the rest of his shift despite sustaining a laceration to the left side of his scalp with bleeding. When pt returned home from work, he experienced a seizure witnessed by his wife. The episode lasted less than 5 minutes and pt was rapidly blinking without any shaking of his body. After the episode halted, pt felt extremely fatigued. Pt then had another episode, similar to the previous one, while on the way to the hospital. Pt reports that he also had a seizure this past Saturday in which he had rapid blinking but also whole-body shaking for ~5 minutes.     Of note, pt's epilepsy had been well controlled on a regimen of Keppra and carbamazepine 400 mg BID for 20 years. Last February, however, pt was admitted at [facility] for carbamazepine toxicity (CBZ level 16.8) after pt took an extra dose of his carbamazepine at that time, not intentionally but out of caution. He was discharged from the hospital on decreased dose of carbamazepine 300 mg BID. Pt had been doing well on this new regimen, without any breakthrough seizures, until this past Saturday when he had the rapid blinking and whole-body shaking.     Other than the cough, pt denies any chest pain, palpitations, shortness of breath when not having coughing fits, headaches, vision changes, fever, chills, abdominal pain, nausea, vomiting, diarrhea, constipation, melena, BRBPR, or urinary symptoms.

## 2024-02-02 NOTE — H&P ADULT - PROBLEM SELECTOR PLAN 4
BPs in acceptable range on admission. Pt on lisinopril at home.  - C/w lisinopril 40 mg daily   - Monitor VS q4hrs BPs in acceptable range on admission. Pt on amlodipine and lisinopril at home.  - C/w amlodipine 2.5 mg daily and lisinopril 40 mg daily   - Monitor VS q4hrs

## 2024-02-02 NOTE — H&P ADULT - TIME BILLING
I had a face to face encounter with this patient. I spent 85 total minutes on chart review, bedside interview and physical exam, orders, interpretation of results, documentation, and coordination of care for this patient.

## 2024-02-03 LAB
CULTURE RESULTS: NO GROWTH — SIGNIFICANT CHANGE UP
SPECIMEN SOURCE: SIGNIFICANT CHANGE UP

## 2024-02-05 LAB — LEVETIRACETAM SERPL-MCNC: 5.1 UG/ML — LOW (ref 10–40)

## 2024-02-15 ENCOUNTER — APPOINTMENT (OUTPATIENT)
Dept: FAMILY MEDICINE | Facility: CLINIC | Age: 54
End: 2024-02-15
Payer: COMMERCIAL

## 2024-02-15 ENCOUNTER — NON-APPOINTMENT (OUTPATIENT)
Age: 54
End: 2024-02-15

## 2024-02-15 VITALS
OXYGEN SATURATION: 97 % | HEIGHT: 73 IN | HEART RATE: 89 BPM | RESPIRATION RATE: 15 BRPM | BODY MASS INDEX: 32.47 KG/M2 | WEIGHT: 245 LBS | SYSTOLIC BLOOD PRESSURE: 109 MMHG | TEMPERATURE: 97.5 F | DIASTOLIC BLOOD PRESSURE: 62 MMHG

## 2024-02-15 DIAGNOSIS — Z00.00 ENCOUNTER FOR GENERAL ADULT MEDICAL EXAMINATION W/OUT ABNORMAL FINDINGS: ICD-10-CM

## 2024-02-15 DIAGNOSIS — R05.3 CHRONIC COUGH: ICD-10-CM

## 2024-02-15 DIAGNOSIS — I51.7 CARDIOMEGALY: ICD-10-CM

## 2024-02-15 DIAGNOSIS — Z82.49 FAMILY HISTORY OF ISCHEMIC HEART DISEASE AND OTHER DISEASES OF THE CIRCULATORY SYSTEM: ICD-10-CM

## 2024-02-15 DIAGNOSIS — M54.50 LOW BACK PAIN, UNSPECIFIED: ICD-10-CM

## 2024-02-15 PROCEDURE — 93000 ELECTROCARDIOGRAM COMPLETE: CPT

## 2024-02-15 PROCEDURE — 99204 OFFICE O/P NEW MOD 45 MIN: CPT

## 2024-02-15 PROCEDURE — 36415 COLL VENOUS BLD VENIPUNCTURE: CPT

## 2024-02-15 RX ORDER — AZITHROMYCIN 250 MG/1
250 TABLET, FILM COATED ORAL
Qty: 1 | Refills: 0 | Status: ACTIVE | COMMUNITY
Start: 2024-02-15 | End: 1900-01-01

## 2024-02-15 RX ORDER — LEVETIRACETAM 500 MG/1
500 TABLET, FILM COATED ORAL
Refills: 0 | Status: ACTIVE | COMMUNITY

## 2024-02-15 RX ORDER — CARBAMAZEPINE 400 MG/1
400 TABLET, EXTENDED RELEASE ORAL
Refills: 0 | Status: ACTIVE | COMMUNITY

## 2024-02-15 RX ORDER — LEVETIRACETAM 1000 MG/1
1000 TABLET, FILM COATED ORAL
Refills: 0 | Status: ACTIVE | COMMUNITY

## 2024-02-15 RX ORDER — LIDOCAINE 5% 700 MG/1
5 PATCH TOPICAL
Qty: 30 | Refills: 1 | Status: ACTIVE | COMMUNITY
Start: 2024-02-15 | End: 1900-01-01

## 2024-02-15 RX ORDER — LISINOPRIL 40 MG/1
40 TABLET ORAL
Refills: 0 | Status: ACTIVE | COMMUNITY

## 2024-02-15 NOTE — HEALTH RISK ASSESSMENT
[Good] : ~his/her~  mood as  good [No] : No [0] : 2) Feeling down, depressed, or hopeless: Not at all (0) [PHQ-2 Negative - No further assessment needed] : PHQ-2 Negative - No further assessment needed [HIV test declined] : HIV test declined [Hepatitis C test declined] : Hepatitis C test declined [With Family] : lives with family [# of Members in Household ___] :  household currently consist of [unfilled] member(s) [Employed] : employed [] :  [# Of Children ___] : has [unfilled] children [Never] : Never [[id number]] : 0 [Reports changes in hearing] : Reports no changes in hearing [Reports changes in vision] : Reports no changes in vision [Reports changes in dental health] : Reports no changes in dental health [Guns at Home] : no guns at home [Travel to Developing Areas] : does not  travel to developing areas [TB Exposure] : is not being exposed to tuberculosis [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

## 2024-02-15 NOTE — HISTORY OF PRESENT ILLNESS
[de-identified] : 53 year old male with history of Epilepsy, HTN presents with c/o cough for 2 months.  He went to urgent care and was given cough medications with no relief.  He reports a fall and hit his head last week and went to ER with neg work including CT head. He believes he took too much Robitussin. He also c/o lt low mid/low back pain from constant cough gerald at night.  Wife states he had increased in Tegretol recently. She believes he passed out and fell.

## 2024-02-15 NOTE — PLAN
[FreeTextEntry1] : Persistent cough- continue Flonase, start Zithromax Syncope- EKG NSR, Cardio referral Low back pain, likely from persistent cough- Lidocaine patch

## 2024-02-16 ENCOUNTER — APPOINTMENT (OUTPATIENT)
Dept: HEART AND VASCULAR | Facility: CLINIC | Age: 54
End: 2024-02-16
Payer: COMMERCIAL

## 2024-02-16 PROCEDURE — 36415 COLL VENOUS BLD VENIPUNCTURE: CPT

## 2024-02-19 VITALS
HEART RATE: 90 BPM | WEIGHT: 244.05 LBS | TEMPERATURE: 98 F | SYSTOLIC BLOOD PRESSURE: 103 MMHG | DIASTOLIC BLOOD PRESSURE: 62 MMHG | HEIGHT: 73 IN | OXYGEN SATURATION: 96 % | RESPIRATION RATE: 20 BRPM

## 2024-02-19 LAB
ADD ON TEST-SPECIMEN IN LAB: SIGNIFICANT CHANGE UP
ADD ON TEST-SPECIMEN IN LAB: SIGNIFICANT CHANGE UP
ALBUMIN SERPL ELPH-MCNC: 3.7 G/DL — SIGNIFICANT CHANGE UP (ref 3.3–5)
ALP SERPL-CCNC: 64 U/L — SIGNIFICANT CHANGE UP (ref 40–120)
ALT FLD-CCNC: 24 U/L — SIGNIFICANT CHANGE UP (ref 10–45)
ANION GAP SERPL CALC-SCNC: 9 MMOL/L — SIGNIFICANT CHANGE UP (ref 5–17)
APPEARANCE UR: ABNORMAL
APTT BLD: 27.4 SEC — SIGNIFICANT CHANGE UP (ref 24.5–35.6)
AST SERPL-CCNC: 20 U/L — SIGNIFICANT CHANGE UP (ref 10–40)
BACTERIA # UR AUTO: NEGATIVE /HPF — SIGNIFICANT CHANGE UP
BASOPHILS # BLD AUTO: 0.05 K/UL — SIGNIFICANT CHANGE UP (ref 0–0.2)
BASOPHILS NFR BLD AUTO: 0.7 % — SIGNIFICANT CHANGE UP (ref 0–2)
BILIRUB SERPL-MCNC: 0.3 MG/DL — SIGNIFICANT CHANGE UP (ref 0.2–1.2)
BILIRUB UR-MCNC: NEGATIVE — SIGNIFICANT CHANGE UP
BUN SERPL-MCNC: 21 MG/DL — SIGNIFICANT CHANGE UP (ref 7–23)
CALCIUM SERPL-MCNC: 8.9 MG/DL — SIGNIFICANT CHANGE UP (ref 8.4–10.5)
CAST: 1 /LPF — SIGNIFICANT CHANGE UP (ref 0–4)
CHLORIDE SERPL-SCNC: 104 MMOL/L — SIGNIFICANT CHANGE UP (ref 96–108)
CO2 SERPL-SCNC: 23 MMOL/L — SIGNIFICANT CHANGE UP (ref 22–31)
COLOR SPEC: SIGNIFICANT CHANGE UP
CREAT SERPL-MCNC: 0.72 MG/DL — SIGNIFICANT CHANGE UP (ref 0.5–1.3)
DIFF PNL FLD: NEGATIVE — SIGNIFICANT CHANGE UP
EGFR: 109 ML/MIN/1.73M2 — SIGNIFICANT CHANGE UP
EOSINOPHIL # BLD AUTO: 0.19 K/UL — SIGNIFICANT CHANGE UP (ref 0–0.5)
EOSINOPHIL NFR BLD AUTO: 2.7 % — SIGNIFICANT CHANGE UP (ref 0–6)
FLUAV AG NPH QL: SIGNIFICANT CHANGE UP
FLUBV AG NPH QL: SIGNIFICANT CHANGE UP
GLUCOSE SERPL-MCNC: 135 MG/DL — HIGH (ref 70–99)
GLUCOSE UR QL: NEGATIVE MG/DL — SIGNIFICANT CHANGE UP
HAPTOGLOB SERPL-MCNC: 446 MG/DL — HIGH (ref 34–200)
HCT VFR BLD CALC: 36.5 % — LOW (ref 39–50)
HGB BLD-MCNC: 11.9 G/DL — LOW (ref 13–17)
IMM GRANULOCYTES NFR BLD AUTO: 0.6 % — SIGNIFICANT CHANGE UP (ref 0–0.9)
INR BLD: 1.42 — HIGH (ref 0.85–1.18)
KETONES UR-MCNC: ABNORMAL MG/DL
LACTATE SERPL-SCNC: 1.6 MMOL/L — SIGNIFICANT CHANGE UP (ref 0.5–2)
LDH SERPL L TO P-CCNC: 201 U/L — SIGNIFICANT CHANGE UP (ref 50–242)
LEUKOCYTE ESTERASE UR-ACNC: NEGATIVE — SIGNIFICANT CHANGE UP
LIDOCAIN IGE QN: 15 U/L — SIGNIFICANT CHANGE UP (ref 7–60)
LYMPHOCYTES # BLD AUTO: 1.33 K/UL — SIGNIFICANT CHANGE UP (ref 1–3.3)
LYMPHOCYTES # BLD AUTO: 19 % — SIGNIFICANT CHANGE UP (ref 13–44)
MCHC RBC-ENTMCNC: 28.3 PG — SIGNIFICANT CHANGE UP (ref 27–34)
MCHC RBC-ENTMCNC: 32.6 GM/DL — SIGNIFICANT CHANGE UP (ref 32–36)
MCV RBC AUTO: 86.7 FL — SIGNIFICANT CHANGE UP (ref 80–100)
MONOCYTES # BLD AUTO: 0.59 K/UL — SIGNIFICANT CHANGE UP (ref 0–0.9)
MONOCYTES NFR BLD AUTO: 8.4 % — SIGNIFICANT CHANGE UP (ref 2–14)
MUCOUS THREADS # UR AUTO: PRESENT
NEUTROPHILS # BLD AUTO: 4.79 K/UL — SIGNIFICANT CHANGE UP (ref 1.8–7.4)
NEUTROPHILS NFR BLD AUTO: 68.6 % — SIGNIFICANT CHANGE UP (ref 43–77)
NITRITE UR-MCNC: NEGATIVE — SIGNIFICANT CHANGE UP
NRBC # BLD: 0 /100 WBCS — SIGNIFICANT CHANGE UP (ref 0–0)
PH UR: 6 — SIGNIFICANT CHANGE UP (ref 5–8)
PLATELET # BLD AUTO: 166 K/UL — SIGNIFICANT CHANGE UP (ref 150–400)
POTASSIUM SERPL-MCNC: 4.1 MMOL/L — SIGNIFICANT CHANGE UP (ref 3.5–5.3)
POTASSIUM SERPL-SCNC: 4.1 MMOL/L — SIGNIFICANT CHANGE UP (ref 3.5–5.3)
PROT SERPL-MCNC: 7.4 G/DL — SIGNIFICANT CHANGE UP (ref 6–8.3)
PROT UR-MCNC: 30 MG/DL
PROTHROM AB SERPL-ACNC: 16 SEC — HIGH (ref 9.5–13)
RBC # BLD: 4.21 M/UL — SIGNIFICANT CHANGE UP (ref 4.2–5.8)
RBC # FLD: 13.7 % — SIGNIFICANT CHANGE UP (ref 10.3–14.5)
RBC CASTS # UR COMP ASSIST: 1 /HPF — SIGNIFICANT CHANGE UP (ref 0–4)
RSV RNA NPH QL NAA+NON-PROBE: SIGNIFICANT CHANGE UP
SARS-COV-2 RNA SPEC QL NAA+PROBE: SIGNIFICANT CHANGE UP
SODIUM SERPL-SCNC: 136 MMOL/L — SIGNIFICANT CHANGE UP (ref 135–145)
SP GR SPEC: >1.03 — HIGH (ref 1–1.03)
SQUAMOUS # UR AUTO: 8 /HPF — HIGH (ref 0–5)
UROBILINOGEN FLD QL: 1 MG/DL — SIGNIFICANT CHANGE UP (ref 0.2–1)
WBC # BLD: 6.99 K/UL — SIGNIFICANT CHANGE UP (ref 3.8–10.5)
WBC # FLD AUTO: 6.99 K/UL — SIGNIFICANT CHANGE UP (ref 3.8–10.5)
WBC UR QL: 3 /HPF — SIGNIFICANT CHANGE UP (ref 0–5)

## 2024-02-19 PROCEDURE — 93010 ELECTROCARDIOGRAM REPORT: CPT

## 2024-02-19 PROCEDURE — 99282 EMERGENCY DEPT VISIT SF MDM: CPT

## 2024-02-19 PROCEDURE — 73590 X-RAY EXAM OF LOWER LEG: CPT | Mod: 26,LT

## 2024-02-19 PROCEDURE — 93971 EXTREMITY STUDY: CPT | Mod: 26,LT

## 2024-02-19 PROCEDURE — 71045 X-RAY EXAM CHEST 1 VIEW: CPT | Mod: 26

## 2024-02-19 PROCEDURE — 71275 CT ANGIOGRAPHY CHEST: CPT | Mod: 26,MA

## 2024-02-19 PROCEDURE — 99285 EMERGENCY DEPT VISIT HI MDM: CPT

## 2024-02-19 PROCEDURE — 73562 X-RAY EXAM OF KNEE 3: CPT | Mod: 26,LT

## 2024-02-19 PROCEDURE — 74174 CTA ABD&PLVS W/CONTRAST: CPT | Mod: 26,MA

## 2024-02-19 RX ORDER — HEPARIN SODIUM 5000 [USP'U]/ML
9000 INJECTION INTRAVENOUS; SUBCUTANEOUS EVERY 6 HOURS
Refills: 0 | Status: DISCONTINUED | OUTPATIENT
Start: 2024-02-19 | End: 2024-02-19

## 2024-02-19 RX ORDER — CARBAMAZEPINE 200 MG
400 TABLET ORAL ONCE
Refills: 0 | Status: COMPLETED | OUTPATIENT
Start: 2024-02-19 | End: 2024-02-19

## 2024-02-19 RX ORDER — KETOROLAC TROMETHAMINE 30 MG/ML
15 SYRINGE (ML) INJECTION ONCE
Refills: 0 | Status: DISCONTINUED | OUTPATIENT
Start: 2024-02-19 | End: 2024-02-19

## 2024-02-19 RX ORDER — ACETAMINOPHEN 500 MG
1000 TABLET ORAL ONCE
Refills: 0 | Status: COMPLETED | OUTPATIENT
Start: 2024-02-19 | End: 2024-02-19

## 2024-02-19 RX ORDER — LEVETIRACETAM 250 MG/1
1500 TABLET, FILM COATED ORAL ONCE
Refills: 0 | Status: COMPLETED | OUTPATIENT
Start: 2024-02-19 | End: 2024-02-19

## 2024-02-19 RX ORDER — ENOXAPARIN SODIUM 100 MG/ML
100 INJECTION SUBCUTANEOUS ONCE
Refills: 0 | Status: COMPLETED | OUTPATIENT
Start: 2024-02-19 | End: 2024-02-19

## 2024-02-19 RX ORDER — HEPARIN SODIUM 5000 [USP'U]/ML
9000 INJECTION INTRAVENOUS; SUBCUTANEOUS ONCE
Refills: 0 | Status: DISCONTINUED | OUTPATIENT
Start: 2024-02-19 | End: 2024-02-19

## 2024-02-19 RX ORDER — SODIUM CHLORIDE 9 MG/ML
1000 INJECTION INTRAMUSCULAR; INTRAVENOUS; SUBCUTANEOUS ONCE
Refills: 0 | Status: COMPLETED | OUTPATIENT
Start: 2024-02-19 | End: 2024-02-19

## 2024-02-19 RX ORDER — HEPARIN SODIUM 5000 [USP'U]/ML
4500 INJECTION INTRAVENOUS; SUBCUTANEOUS EVERY 6 HOURS
Refills: 0 | Status: DISCONTINUED | OUTPATIENT
Start: 2024-02-19 | End: 2024-02-19

## 2024-02-19 RX ORDER — HEPARIN SODIUM 5000 [USP'U]/ML
INJECTION INTRAVENOUS; SUBCUTANEOUS
Qty: 25000 | Refills: 0 | Status: DISCONTINUED | OUTPATIENT
Start: 2024-02-19 | End: 2024-02-19

## 2024-02-19 RX ADMIN — Medication 400 MILLIGRAM(S): at 16:05

## 2024-02-19 RX ADMIN — SODIUM CHLORIDE 1000 MILLILITER(S): 9 INJECTION INTRAMUSCULAR; INTRAVENOUS; SUBCUTANEOUS at 19:29

## 2024-02-19 RX ADMIN — ENOXAPARIN SODIUM 100 MILLIGRAM(S): 100 INJECTION SUBCUTANEOUS at 22:34

## 2024-02-19 RX ADMIN — SODIUM CHLORIDE 1000 MILLILITER(S): 9 INJECTION INTRAMUSCULAR; INTRAVENOUS; SUBCUTANEOUS at 14:52

## 2024-02-19 RX ADMIN — Medication 15 MILLIGRAM(S): at 16:05

## 2024-02-19 RX ADMIN — LEVETIRACETAM 1500 MILLIGRAM(S): 250 TABLET, FILM COATED ORAL at 21:12

## 2024-02-19 RX ADMIN — Medication 400 MILLIGRAM(S): at 22:35

## 2024-02-19 NOTE — ED PROVIDER NOTE - OBJECTIVE STATEMENT
53 year old male with history of epilepsy, HTN presents with near syncope and multiple seizure episodes on Thursday 53 year old male with history of epilepsy, HTN presenting with LLE pain x 4d. States that he has had pain to his L calf region, having trouble walking, recommended to have r/o DVT by his PMD Dr. Hyman. Has been on azithromycin course recently, last dose today, for somewhat persistent cough since January--feels it is helping. Spouse feels he is somewhat weak overall but patient does not agree--he states he is tired but lack of sleep. Has not had a recent seizure but at times "shakes when his eyes are closed". No fevers, chills, chest pain, shortness of breath, abdominal pain, n/v/d. No prior DVT or PE. Was recently admitted at [facility] following a fall--had negative CTH.

## 2024-02-19 NOTE — CONSULT NOTE ADULT - SUBJECTIVE AND OBJECTIVE BOX
Vascular Attending:  Dr. Artemio Garcia Complaint: LLE DVT       HPI:  53M  PMH HTN, epilepsy present to the ED with 4 day of LLE calf pain. Pt states that he had pain while ambulating, he is only able to walk for about 1/4 mile on a good day and is having trouble ambulating through out the house. PT states that this is the first time he had this kind of pain. He denies smoking, recent travel. He mentioned that he fell 2 weeks ago but at the time did not have any issues with his legs. PT denies fever, chills, H/A, SOB, N/V/D. ED had done US shows that Left mid femoral vein extending inferiorly to the L deep calf veins." vascular consulted for further management.       PAST MEDICAL & SURGICAL HISTORY:  Hypertension      Epilepsy      No significant past surgical history          REVIEW OF SYSTEMS  All neg unless otherwise mentioned in HPI.       MEDICATIONS  (STANDING):  carBAMazepine ER Tablet 400 milliGRAM(s) Oral once  levETIRAcetam 1500 milliGRAM(s) Oral once    MEDICATIONS  (PRN):      Allergies    No Known Allergies    Intolerances        SOCIAL HISTORY:    FAMILY HISTORY:  FH: hypertension        Vital Signs Last 24 Hrs  T(C): 36.7 (19 Feb 2024 19:42), Max: 37.1 (19 Feb 2024 16:13)  T(F): 98 (19 Feb 2024 19:42), Max: 98.7 (19 Feb 2024 16:13)  HR: 79 (19 Feb 2024 19:42) (79 - 90)  BP: 143/92 (19 Feb 2024 19:42) (103/62 - 143/92)  BP(mean): --  RR: 18 (19 Feb 2024 19:42) (18 - 20)  SpO2: 96% (19 Feb 2024 19:42) (96% - 97%)    Parameters below as of 19 Feb 2024 19:42  Patient On (Oxygen Delivery Method): room air        PHYSICAL EXAM:  Constitutional: NAD  Respiratory: RA  Cardiovascular: NSR  Gastrointestinal: soft, NT, ND  Vascular: LLE: mild to mod tenderness to posterior of calf. palpable distal pulses. motor and sensory function intact. warm to touch. compartments are soft.         LABS:                        11.9   6.99  )-----------( 166      ( 19 Feb 2024 14:40 )             36.5     02-19    136  |  104  |  21  ----------------------------<  135<H>  4.1   |  23  |  0.72    Ca    8.9      19 Feb 2024 14:40    TPro  7.4  /  Alb  3.7  /  TBili  0.3  /  DBili  0.2  /  AST  20  /  ALT  24  /  AlkPhos  64  02-19    PT/INR - ( 19 Feb 2024 20:39 )   PT: 16.0 sec;   INR: 1.42          PTT - ( 19 Feb 2024 20:39 )  PTT:27.4 sec  Urinalysis Basic - ( 19 Feb 2024 14:40 )    Color: x / Appearance: x / SG: x / pH: x  Gluc: 135 mg/dL / Ketone: x  / Bili: x / Urobili: x   Blood: x / Protein: x / Nitrite: x   Leuk Esterase: x / RBC: x / WBC x   Sq Epi: x / Non Sq Epi: x / Bacteria: x        RADIOLOGY & ADDITIONAL STUDIES      A/P: 53M  PMH HTN, epilepsy present to the ED with 4 day of LLE calf pain. ED US shows that Left mid femoral vein extending inferiorly to the L deep calf veins." vascular consulted for further management.     Vascular/PAD:  - no acute vascular surgical intervention   - please start full AC on the patient   - When patient is medically stable to leave, please follow up in outpatient clinic with Dr. Ulloa [phone]  - discussed with chief on call   - [id number]

## 2024-02-19 NOTE — ED PROVIDER NOTE - CLINICAL SUMMARY MEDICAL DECISION MAKING FREE TEXT BOX
53 year old male with history of epilepsy, HTN presenting with LLE pain x 4d. 53 year old male with history of epilepsy, HTN presenting with LLE pain x 4d. Somewhat tired appearing but overall comfortable here with good vitals. Exam is overall unrevealing beyond bruising and tenderness over L medial calf without associated edema/warmth/erythema. Low suspicion for acute DVT but will obtain duplex to r/o given recent hospitalization. Will also obtain XR to r/o fx given he fell 2 wk ago. Labs to r/o electrolyte derangement vs anemia vs organ dysfunction.     Despite spouse's report that patient has been somewhat deconditioned and failing to thrive at home--patient is adamant that he feels normal/baseline, simply reports that he has been unable to socialize at normal levels due to pain in his L leg as well as a persistent cough that is now improving with azithromycin course given by his PMD. If work up here is nonactionable, would DC with close pmd f/u. I

## 2024-02-19 NOTE — ED ADULT NURSE NOTE - OBJECTIVE STATEMENT
Pt is a 54 yo M here c/o L leg pain since starting PO azithromycin 4 days ago for cough that has been ongoing for 2 months. Per pt's wife, pt has been increasingly fatigued and unwilling to leave the house. States over the past week he has also been coughing up blood. Denies any cp, sob, recent travel. Told to present here by pcp to r/o dvt.  On exam, pt speaking in full and complete sentences, respirations even and unlabored. DP pulses intact, no redness or swelling noted to LLE. Pt's overall skin mildly dusky/jaundice. Pt is a 54 yo M here c/o L leg pain since starting PO azithromycin 4 days ago for cough that has been ongoing for 2 months. Per pt's wife, pt has been increasingly fatigued and unwilling to leave the house. States over the past week he has also been coughing up blood. Denies any cp, sob, recent travel. Told to present here by pcp to r/o dvt.  H/o epilepsy on keppra, seen at [facility] 2 weeks ago for ?syncopal vs seizure with HS.  On exam, pt speaking in full and complete sentences, respirations even and unlabored. DP pulses intact, no redness or swelling noted to LLE. Pt's overall skin mildly dusky/jaundice.

## 2024-02-19 NOTE — ED ADULT NURSE NOTE - NSFALLRISKINTERV_ED_ALL_ED
Assistance OOB with selected safe patient handling equipment if applicable/Assistance with ambulation/Communicate fall risk and risk factors to all staff, patient, and family/Monitor gait and stability/Provide visual cue: yellow wristband, yellow gown, etc/Reinforce activity limits and safety measures with patient and family/Call bell, personal items and telephone in reach/Instruct patient to call for assistance before getting out of bed/chair/stretcher/Non-slip footwear applied when patient is off stretcher/[place] to call system/Physically safe environment - no spills, clutter or unnecessary equipment/Purposeful Proactive Rounding/Room/bathroom lighting operational, light cord in reach

## 2024-02-19 NOTE — ED PROVIDER NOTE - PHYSICAL EXAMINATION
Gen - Nontoxic but tired appearing; A+Ox3   HEENT - NCAT, EOMI, moist mucous membranes, clear oropharynx  Neck - supple  Resp - CTAB, no increased WOB  CV -  RRR, no m/r/g  Abd - soft, NT, ND; no guarding or rebound  Back - no midline, paraspinous, or CVA tenderness  MSK - FROM of b/l UE and LE, no gross deformities, soft compartments, NVI distally throughout  Extrem - no LE edema/erythema; some tenderness to L proximal medial calf  Neuro - no focal motor or sensation deficits  Skin - warm, well perfused; mild ecchymosis over L medial proximal calf

## 2024-02-19 NOTE — ED PROVIDER NOTE - PROGRESS NOTE DETAILS
seven - received on sign out pending XR and US.   US "IMPRESSION: Acute occlusive deep venous thrombosis within the left mid femoral vein   extending inferiorly to the left deep calf veins"    US as above. vascular consulted - recs CT imaging to assess if through iliac. will also add CT chest given ongoing cough and unprovoked dvt. assess for malignancy.   otherwise will start heparin gtt / bolus.   pending CT imaging / admission vascular final recs pt can be dc on doac.   however CTA showing bilateral PE w area possible early pulm infarct.   no RH strain on CT, trop / bnp ok. vitals stable.   pt started on lovenox. admitted to medicine for further workup / managemnet. seven - received on sign out pending XR and US.   US "IMPRESSION: Acute occlusive deep venous thrombosis within the left mid femoral vein   extending inferiorly to the left deep calf veins"    US as above. vascular consulted - recs CT imaging to assess if through iliac. will also add CT chest given ongoing cough and unprovoked dvt. assess for malignancy.   pending CT imaging / admission

## 2024-02-20 ENCOUNTER — INPATIENT (INPATIENT)
Facility: HOSPITAL | Age: 54
LOS: 0 days | Discharge: HOME | DRG: 176 | End: 2024-02-20
Attending: STUDENT IN AN ORGANIZED HEALTH CARE EDUCATION/TRAINING PROGRAM | Admitting: STUDENT IN AN ORGANIZED HEALTH CARE EDUCATION/TRAINING PROGRAM
Payer: COMMERCIAL

## 2024-02-20 ENCOUNTER — TRANSCRIPTION ENCOUNTER (OUTPATIENT)
Age: 54
End: 2024-02-20

## 2024-02-20 VITALS — DIASTOLIC BLOOD PRESSURE: 81 MMHG | HEART RATE: 88 BPM | SYSTOLIC BLOOD PRESSURE: 152 MMHG

## 2024-02-20 DIAGNOSIS — Z29.9 ENCOUNTER FOR PROPHYLACTIC MEASURES, UNSPECIFIED: ICD-10-CM

## 2024-02-20 DIAGNOSIS — I26.99 OTHER PULMONARY EMBOLISM WITHOUT ACUTE COR PULMONALE: ICD-10-CM

## 2024-02-20 DIAGNOSIS — I80.10 PHLEBITIS AND THROMBOPHLEBITIS OF UNSPECIFIED FEMORAL VEIN: ICD-10-CM

## 2024-02-20 DIAGNOSIS — M79.605 PAIN IN LEFT LEG: ICD-10-CM

## 2024-02-20 DIAGNOSIS — G40.909 EPILEPSY, UNSPECIFIED, NOT INTRACTABLE, WITHOUT STATUS EPILEPTICUS: ICD-10-CM

## 2024-02-20 DIAGNOSIS — I10 ESSENTIAL (PRIMARY) HYPERTENSION: ICD-10-CM

## 2024-02-20 DIAGNOSIS — I82.409 ACUTE EMBOLISM AND THROMBOSIS OF UNSPECIFIED DEEP VEINS OF UNSPECIFIED LOWER EXTREMITY: ICD-10-CM

## 2024-02-20 DIAGNOSIS — D64.9 ANEMIA, UNSPECIFIED: ICD-10-CM

## 2024-02-20 LAB
ALBUMIN SERPL ELPH-MCNC: 3.2 G/DL — LOW (ref 3.3–5)
ALBUMIN SERPL ELPH-MCNC: 4.3 G/DL
ALP BLD-CCNC: 76 U/L
ALP SERPL-CCNC: 59 U/L — SIGNIFICANT CHANGE UP (ref 40–120)
ALT FLD-CCNC: 24 U/L — SIGNIFICANT CHANGE UP (ref 10–45)
ALT SERPL-CCNC: 30 U/L
ANION GAP SERPL CALC-SCNC: 11 MMOL/L
ANION GAP SERPL CALC-SCNC: 11 MMOL/L — SIGNIFICANT CHANGE UP (ref 5–17)
APTT BLD: 28.5 SEC — SIGNIFICANT CHANGE UP (ref 24.5–35.6)
AST SERPL-CCNC: 16 U/L
AST SERPL-CCNC: 22 U/L — SIGNIFICANT CHANGE UP (ref 10–40)
BASOPHILS # BLD AUTO: 0.03 K/UL — SIGNIFICANT CHANGE UP (ref 0–0.2)
BASOPHILS # BLD AUTO: 0.04 K/UL
BASOPHILS NFR BLD AUTO: 0.4 %
BASOPHILS NFR BLD AUTO: 0.4 % — SIGNIFICANT CHANGE UP (ref 0–2)
BILIRUB SERPL-MCNC: 0.3 MG/DL — SIGNIFICANT CHANGE UP (ref 0.2–1.2)
BILIRUB SERPL-MCNC: 0.6 MG/DL
BUN SERPL-MCNC: 14 MG/DL — SIGNIFICANT CHANGE UP (ref 7–23)
BUN SERPL-MCNC: 21 MG/DL
CALCIUM SERPL-MCNC: 8.3 MG/DL — LOW (ref 8.4–10.5)
CALCIUM SERPL-MCNC: 9.2 MG/DL
CHLORIDE SERPL-SCNC: 101 MMOL/L
CHLORIDE SERPL-SCNC: 105 MMOL/L — SIGNIFICANT CHANGE UP (ref 96–108)
CHOLEST SERPL-MCNC: 165 MG/DL
CO2 SERPL-SCNC: 23 MMOL/L — SIGNIFICANT CHANGE UP (ref 22–31)
CO2 SERPL-SCNC: 27 MMOL/L
CREAT SERPL-MCNC: 0.73 MG/DL — SIGNIFICANT CHANGE UP (ref 0.5–1.3)
CREAT SERPL-MCNC: 0.85 MG/DL
EGFR: 104 ML/MIN/1.73M2
EGFR: 109 ML/MIN/1.73M2 — SIGNIFICANT CHANGE UP
EOSINOPHIL # BLD AUTO: 0.11 K/UL
EOSINOPHIL # BLD AUTO: 0.22 K/UL — SIGNIFICANT CHANGE UP (ref 0–0.5)
EOSINOPHIL NFR BLD AUTO: 1 %
EOSINOPHIL NFR BLD AUTO: 3.2 % — SIGNIFICANT CHANGE UP (ref 0–6)
ESTIMATED AVERAGE GLUCOSE: 120 MG/DL
GLUCOSE SERPL-MCNC: 105 MG/DL — HIGH (ref 70–99)
GLUCOSE SERPL-MCNC: 140 MG/DL
HBA1C MFR BLD HPLC: 5.8 %
HCT VFR BLD CALC: 37 % — LOW (ref 39–50)
HCT VFR BLD CALC: 44.4 %
HDLC SERPL-MCNC: 43 MG/DL
HGB BLD-MCNC: 12.1 G/DL — LOW (ref 13–17)
HGB BLD-MCNC: 14.4 G/DL
IMM GRANULOCYTES NFR BLD AUTO: 0.6 % — SIGNIFICANT CHANGE UP (ref 0–0.9)
IMM GRANULOCYTES NFR BLD AUTO: 1 %
INR BLD: 1.09 — SIGNIFICANT CHANGE UP (ref 0.85–1.18)
LACTATE SERPL-SCNC: 0.7 MMOL/L — SIGNIFICANT CHANGE UP (ref 0.5–2)
LDLC SERPL CALC-MCNC: 94 MG/DL
LYMPHOCYTES # BLD AUTO: 1.68 K/UL — SIGNIFICANT CHANGE UP (ref 1–3.3)
LYMPHOCYTES # BLD AUTO: 2.36 K/UL
LYMPHOCYTES # BLD AUTO: 24.1 % — SIGNIFICANT CHANGE UP (ref 13–44)
LYMPHOCYTES NFR BLD AUTO: 22.1 %
MAGNESIUM SERPL-MCNC: 2 MG/DL — SIGNIFICANT CHANGE UP (ref 1.6–2.6)
MAN DIFF?: NORMAL
MCHC RBC-ENTMCNC: 27.7 PG
MCHC RBC-ENTMCNC: 28.3 PG — SIGNIFICANT CHANGE UP (ref 27–34)
MCHC RBC-ENTMCNC: 32.4 GM/DL
MCHC RBC-ENTMCNC: 32.7 GM/DL — SIGNIFICANT CHANGE UP (ref 32–36)
MCV RBC AUTO: 85.5 FL
MCV RBC AUTO: 86.4 FL — SIGNIFICANT CHANGE UP (ref 80–100)
MONOCYTES # BLD AUTO: 0.69 K/UL — SIGNIFICANT CHANGE UP (ref 0–0.9)
MONOCYTES # BLD AUTO: 1.01 K/UL
MONOCYTES NFR BLD AUTO: 9.5 %
MONOCYTES NFR BLD AUTO: 9.9 % — SIGNIFICANT CHANGE UP (ref 2–14)
NEUTROPHILS # BLD AUTO: 4.3 K/UL — SIGNIFICANT CHANGE UP (ref 1.8–7.4)
NEUTROPHILS # BLD AUTO: 7.03 K/UL
NEUTROPHILS NFR BLD AUTO: 61.8 % — SIGNIFICANT CHANGE UP (ref 43–77)
NEUTROPHILS NFR BLD AUTO: 66 %
NONHDLC SERPL-MCNC: 122 MG/DL
NRBC # BLD: 0 /100 WBCS — SIGNIFICANT CHANGE UP (ref 0–0)
NT-PROBNP SERPL-SCNC: 188 PG/ML — SIGNIFICANT CHANGE UP (ref 0–300)
PHOSPHATE SERPL-MCNC: 2.4 MG/DL — LOW (ref 2.5–4.5)
PLATELET # BLD AUTO: 182 K/UL — SIGNIFICANT CHANGE UP (ref 150–400)
PLATELET # BLD AUTO: 189 K/UL
POTASSIUM SERPL-MCNC: 4.3 MMOL/L — SIGNIFICANT CHANGE UP (ref 3.5–5.3)
POTASSIUM SERPL-SCNC: 4.3 MMOL/L — SIGNIFICANT CHANGE UP (ref 3.5–5.3)
POTASSIUM SERPL-SCNC: 4.9 MMOL/L
PROT SERPL-MCNC: 7 G/DL — SIGNIFICANT CHANGE UP (ref 6–8.3)
PROT SERPL-MCNC: 7.8 G/DL
PROTHROM AB SERPL-ACNC: 12.4 SEC — SIGNIFICANT CHANGE UP (ref 9.5–13)
RBC # BLD: 4.28 M/UL — SIGNIFICANT CHANGE UP (ref 4.2–5.8)
RBC # BLD: 5.19 M/UL
RBC # FLD: 13.2 % — SIGNIFICANT CHANGE UP (ref 10.3–14.5)
RBC # FLD: 14.2 %
SODIUM SERPL-SCNC: 139 MMOL/L
SODIUM SERPL-SCNC: 139 MMOL/L — SIGNIFICANT CHANGE UP (ref 135–145)
TRIGL SERPL-MCNC: 164 MG/DL
TROPONIN T, HIGH SENSITIVITY RESULT: 14 NG/L — SIGNIFICANT CHANGE UP (ref 0–51)
TSH SERPL-ACNC: 1.89 UIU/ML
WBC # BLD: 6.96 K/UL — SIGNIFICANT CHANGE UP (ref 3.8–10.5)
WBC # FLD AUTO: 10.66 K/UL
WBC # FLD AUTO: 6.96 K/UL — SIGNIFICANT CHANGE UP (ref 3.8–10.5)

## 2024-02-20 PROCEDURE — 73562 X-RAY EXAM OF KNEE 3: CPT

## 2024-02-20 PROCEDURE — 93005 ELECTROCARDIOGRAM TRACING: CPT

## 2024-02-20 PROCEDURE — 83010 ASSAY OF HAPTOGLOBIN QUANT: CPT

## 2024-02-20 PROCEDURE — 96375 TX/PRO/DX INJ NEW DRUG ADDON: CPT

## 2024-02-20 PROCEDURE — 71045 X-RAY EXAM CHEST 1 VIEW: CPT

## 2024-02-20 PROCEDURE — 36415 COLL VENOUS BLD VENIPUNCTURE: CPT

## 2024-02-20 PROCEDURE — 96374 THER/PROPH/DIAG INJ IV PUSH: CPT

## 2024-02-20 PROCEDURE — 83880 ASSAY OF NATRIURETIC PEPTIDE: CPT

## 2024-02-20 PROCEDURE — 99285 EMERGENCY DEPT VISIT HI MDM: CPT

## 2024-02-20 PROCEDURE — 83615 LACTATE (LD) (LDH) ENZYME: CPT

## 2024-02-20 PROCEDURE — 83690 ASSAY OF LIPASE: CPT

## 2024-02-20 PROCEDURE — 83605 ASSAY OF LACTIC ACID: CPT

## 2024-02-20 PROCEDURE — 84484 ASSAY OF TROPONIN QUANT: CPT

## 2024-02-20 PROCEDURE — 85730 THROMBOPLASTIN TIME PARTIAL: CPT

## 2024-02-20 PROCEDURE — 82248 BILIRUBIN DIRECT: CPT

## 2024-02-20 PROCEDURE — 71275 CT ANGIOGRAPHY CHEST: CPT | Mod: MA

## 2024-02-20 PROCEDURE — 93971 EXTREMITY STUDY: CPT

## 2024-02-20 PROCEDURE — 85610 PROTHROMBIN TIME: CPT

## 2024-02-20 PROCEDURE — 81001 URINALYSIS AUTO W/SCOPE: CPT

## 2024-02-20 PROCEDURE — 87637 SARSCOV2&INF A&B&RSV AMP PRB: CPT

## 2024-02-20 PROCEDURE — 80053 COMPREHEN METABOLIC PANEL: CPT

## 2024-02-20 PROCEDURE — 85025 COMPLETE CBC W/AUTO DIFF WBC: CPT

## 2024-02-20 PROCEDURE — 83735 ASSAY OF MAGNESIUM: CPT

## 2024-02-20 PROCEDURE — 84100 ASSAY OF PHOSPHORUS: CPT

## 2024-02-20 PROCEDURE — 73590 X-RAY EXAM OF LOWER LEG: CPT

## 2024-02-20 PROCEDURE — 74174 CTA ABD&PLVS W/CONTRAST: CPT | Mod: MA

## 2024-02-20 PROCEDURE — 99223 1ST HOSP IP/OBS HIGH 75: CPT | Mod: GC

## 2024-02-20 RX ORDER — LIDOCAINE 4 G/100G
1 CREAM TOPICAL DAILY
Refills: 0 | Status: DISCONTINUED | OUTPATIENT
Start: 2024-02-20 | End: 2024-02-20

## 2024-02-20 RX ORDER — LEVETIRACETAM 250 MG/1
1000 TABLET, FILM COATED ORAL
Refills: 0 | Status: DISCONTINUED | OUTPATIENT
Start: 2024-02-20 | End: 2024-02-20

## 2024-02-20 RX ORDER — LIDOCAINE 4 G/100G
1 CREAM TOPICAL
Qty: 1 | Refills: 0
Start: 2024-02-20 | End: 2024-03-20

## 2024-02-20 RX ORDER — LANOLIN ALCOHOL/MO/W.PET/CERES
3 CREAM (GRAM) TOPICAL AT BEDTIME
Refills: 0 | Status: DISCONTINUED | OUTPATIENT
Start: 2024-02-20 | End: 2024-02-20

## 2024-02-20 RX ORDER — APIXABAN 2.5 MG/1
2 TABLET, FILM COATED ORAL
Qty: 120 | Refills: 0
Start: 2024-02-20 | End: 2024-03-20

## 2024-02-20 RX ORDER — LISINOPRIL 2.5 MG/1
40 TABLET ORAL EVERY 24 HOURS
Refills: 0 | Status: DISCONTINUED | OUTPATIENT
Start: 2024-02-20 | End: 2024-02-20

## 2024-02-20 RX ORDER — AMLODIPINE BESYLATE 2.5 MG/1
2.5 TABLET ORAL EVERY 24 HOURS
Refills: 0 | Status: DISCONTINUED | OUTPATIENT
Start: 2024-02-20 | End: 2024-02-20

## 2024-02-20 RX ORDER — CARBAMAZEPINE 200 MG
400 TABLET ORAL
Refills: 0 | Status: DISCONTINUED | OUTPATIENT
Start: 2024-02-20 | End: 2024-02-20

## 2024-02-20 RX ORDER — ACETAMINOPHEN 500 MG
650 TABLET ORAL EVERY 6 HOURS
Refills: 0 | Status: DISCONTINUED | OUTPATIENT
Start: 2024-02-20 | End: 2024-02-20

## 2024-02-20 RX ORDER — SODIUM,POTASSIUM PHOSPHATES 278-250MG
2 POWDER IN PACKET (EA) ORAL ONCE
Refills: 0 | Status: COMPLETED | OUTPATIENT
Start: 2024-02-20 | End: 2024-02-20

## 2024-02-20 RX ORDER — APIXABAN 2.5 MG/1
10 TABLET, FILM COATED ORAL EVERY 12 HOURS
Refills: 0 | Status: DISCONTINUED | OUTPATIENT
Start: 2024-02-20 | End: 2024-02-20

## 2024-02-20 RX ORDER — ONDANSETRON 8 MG/1
4 TABLET, FILM COATED ORAL EVERY 8 HOURS
Refills: 0 | Status: DISCONTINUED | OUTPATIENT
Start: 2024-02-20 | End: 2024-02-20

## 2024-02-20 RX ORDER — LEVETIRACETAM 250 MG/1
1500 TABLET, FILM COATED ORAL
Refills: 0 | Status: DISCONTINUED | OUTPATIENT
Start: 2024-02-20 | End: 2024-02-20

## 2024-02-20 RX ADMIN — LISINOPRIL 40 MILLIGRAM(S): 2.5 TABLET ORAL at 09:06

## 2024-02-20 RX ADMIN — Medication 400 MILLIGRAM(S): at 06:53

## 2024-02-20 RX ADMIN — LEVETIRACETAM 1000 MILLIGRAM(S): 250 TABLET, FILM COATED ORAL at 07:14

## 2024-02-20 RX ADMIN — Medication 650 MILLIGRAM(S): at 10:06

## 2024-02-20 RX ADMIN — Medication 2 PACKET(S): at 12:19

## 2024-02-20 RX ADMIN — AMLODIPINE BESYLATE 2.5 MILLIGRAM(S): 2.5 TABLET ORAL at 12:19

## 2024-02-20 RX ADMIN — Medication 650 MILLIGRAM(S): at 09:06

## 2024-02-20 RX ADMIN — Medication 650 MILLIGRAM(S): at 02:44

## 2024-02-20 RX ADMIN — Medication 650 MILLIGRAM(S): at 01:44

## 2024-02-20 RX ADMIN — LIDOCAINE 1 PATCH: 4 CREAM TOPICAL at 09:10

## 2024-02-20 RX ADMIN — APIXABAN 10 MILLIGRAM(S): 2.5 TABLET, FILM COATED ORAL at 09:13

## 2024-02-20 NOTE — DISCHARGE NOTE NURSING/CASE MANAGEMENT/SOCIAL WORK - PATIENT PORTAL LINK FT
You can access the FollowMyHealth Patient Portal offered by [facility] by registering at the following website: [email or web address]. By joining [employer]’s FollowMyHealth portal, you will also be able to view your health information using other applications (apps) compatible with our system.

## 2024-02-20 NOTE — PROGRESS NOTE ADULT - PROBLEM SELECTOR PLAN 6
F: None  E: Replete PRN  N: DASH diet  DVT: Full dose AC Eliquis  Code: Full - C/w Amlodipine 2.5 Qd, and Lisinopril 40 Qd

## 2024-02-20 NOTE — DISCHARGE NOTE PROVIDER - HOSPITAL COURSE
#Discharge: do not delete    Patient is __ yo M/F with past medical history of _____, presented with _____, found to have _____      Problem List/Main Diagnoses:     New medications/therapies:   New lines/hardware:  Labs to be followed outpatient:   Exam to be followed outpatient:     Discharge plan: discharge to ______    Physical Exam Upon Discharge:     #Discharge: do not delete    53M PMHx epilepsy, HTN p/w likely provoked L DVT and PE in the setting of subacute/chronic cough    Problem List/Main Diagnoses:   #Pulmonary embolism.   ·  Plan: #Chronic cough  CTA PE and A/P with IV contrast: Large PE with segmental/subsegmental extension b/l. No R heart strain. Opacities in posterior and lateral RLL may indicate evolving infarction/hemorrhage. Evidence of L DVT. US LE: Acute DVT in L mid femoral vein, extends to L deep calf veins. EKG: TWI in lead III, otherwise no TWI or ST depressions. Lead III TWI not present on previous EKG 2 weeks prior to this admission. PESI score 63, Class I, Very low risk: 0-1.6% 30d mortality. Likely provoked PE iso 3 weeks of stasis iso medical leave from job and not feeling well 2/2 cough. No family hx clotting d/o, PE, DVT. Cardiac enzymes wnl. Vascular surgery consulted, no acute indication for surgery    - Eliquis 10Q12 loading dose, 5Q12 after 1 week.     #DVT, lower extremity.   ·  Plan: Full dose AC with Eliquis as above    #Anemia.   ·  Plan: Hgb 11.9, MCV 86 (Baseline Hgb 14)  Potentially hemodilutional, s/p 2L NS.   Bilirubin WNL, , WNL. Haptoglobin 446. Unlikely hemolysis, however hemolysis possible iso large clot burden in DVT and PE.     #Seizure disorder.   ·  Plan: - C/w home Carbamazepine 400 Q12. Received home med keppra during admission.    #HTN (hypertension).   ·  Plan: - C/w Amlodipine 2.5 Qd, and Lisinopril 40 Qd    New medications/therapies: eliquis starter pack  New lines/hardware: none  Labs to be followed outpatient: none  Exam to be followed outpatient: none    Discharge plan: discharge to home    Physical Exam Upon Discharge:  HEENT: NC/AT, scar evident on L paritotemporal scalp from fall; PERRL; Neck Supple; MMM  Respiratory: CTAB; no wheezes/rales/rhonchi, normal WOB  Cardiovascular: Regular rhythm/ rate, + S1/S2; no murmurs, rubs gallops   Gastrointestinal: Soft; NTND; bowel sounds normal and present  : no suprapubic tenderness  Vascular: extremities WWP; no edema/cyanosis, 2+ distal pulses b/l. Positive Penny sign on LLE  Neurological: A&Ox3, no obvious focal deficits  Integument: No gross skin abnormalities or rashes

## 2024-02-20 NOTE — PROGRESS NOTE ADULT - PROBLEM SELECTOR PLAN 3
Hgb 11.9, MCV 86 (Baseline Hgb 14)  Potentially hemodilutional, s/p 2L NS.   Bilirubin WNL, , WNL. Haptoglobin 446. Unlikely hemolysis, however hemolysis possible iso large clot burden in DVT and PE.   - Transfuse PRN In the setting of DVT extending from femoral vein to popliteal vein. Endorses pain uncontrolled by Tylenol.     -consider Oxycodone 5mg short course

## 2024-02-20 NOTE — H&P ADULT - PROBLEM SELECTOR PLAN 3
Hgb 11.9, MCV 86 (Baseline Hgb 14)  Potentially hemodilutional, s/p 2L NS.   Bilirubin WNL, , WNL. Haptoglobin 446. Unlikely hemolysis, however hemolysis possible iso large clot burden in DVT and PE.   - Transfuse PRN

## 2024-02-20 NOTE — H&P ADULT - HISTORY OF PRESENT ILLNESS
53M PMHx epilepsy, HTN p/w 4d hx L calf pain and difficultly walking, evaluated by PMD Dr. Hyman who recommended that he get evaluated for DVT.   Pt reports 1 mo hx cough, recently this week developed hemoptysis: Cough is improving. No recent LOC, seizures.   Pt reports 3 weeks of stasis iso medical leave from job and not feeling well 2/2 cough, has spent a lot of time on his bed recovering. Denies recent plane or train ride. Denies family hx of clotting disorder, father had open heart surgery iso ?aortic valve repair, needed AC afterwards otherwise no significnat family hx.   Was recently admitted at [facility] iso fall iso large amount of Robitussin intake outpt (No LOC at that time). Pt returned home and had a seizure, was BIBEMS. Pt was discharged on increased dosage of AED.     ED Vitals: T97.7F, HR 90, /62 --> 120s-140s/80s. SPO2 98 RA. RR 18.   ED Labs: WBC 7 differential WNL. Hgb 11.9, MCV 86 (Baseline Hgb 14), Plt 166. CMP WNL including BUN/Cr 21/72. AST/ALT 20/24. Bilirubin WNL.   PTT 27.4. PT/INR 16/1.42.   Lactate 1.6.   , WNL. Haptoglobin 446. Trop T 14, WNL. ProBNP 40 WNL.     ED Studies: CTA PE and A/P with IV contrast: Large PE with segmental/subsegmental extension b/l. No R heart strain. Opacities in posterior and lateral RLL may indicate evolving infarction/hemorrhage. Evidence of L DVT.   US LE: Acute DVT in L mid femoral vein, extends to L deep calf veins.   CXR: No clear consolidations, pending read  UA SG 1.030, Neg LE/N/WBC/Bacteria. +Epithelial cells.   RVP neg.  EKG: TWI in lead III, otherwise no TWI or ST depressions. Lead III TWI not present on previous EKG 2 weeks prior to this admission.   XR L Knee and Tib/fib pending read    ED Interventions: Ofirmev 1g x1, Toradol 15 mg x1, Carbamazepine 400mg x1, Keppra 1.5g x1, Lovenox 100mg x1, NS 2L x1.

## 2024-02-20 NOTE — H&P ADULT - NSHPLABSRESULTS_GEN_ALL_CORE
LABS:                        11.9   6.99  )-----------( 166      ( 19 Feb 2024 14:40 )             36.5     02-19    136  |  104  |  21  ----------------------------<  135<H>  4.1   |  23  |  0.72    Ca    8.9      19 Feb 2024 14:40    TPro  7.4  /  Alb  3.7  /  TBili  0.3  /  DBili  0.2  /  AST  20  /  ALT  24  /  AlkPhos  64  02-19    PT/INR - ( 19 Feb 2024 20:39 )   PT: 16.0 sec;   INR: 1.42          PTT - ( 19 Feb 2024 20:39 )  PTT:27.4 sec  Urinalysis Basic - ( 19 Feb 2024 14:40 )    Color: Dark Yellow / Appearance: Cloudy / SG: >1.030 / pH: x  Gluc: 135 mg/dL / Ketone: Trace mg/dL  / Bili: Negative / Urobili: 1.0 mg/dL   Blood: x / Protein: 30 mg/dL / Nitrite: Negative   Leuk Esterase: Negative / RBC: 1 /HPF / WBC 3 /HPF   Sq Epi: x / Non Sq Epi: 8 /HPF / Bacteria: Negative /HPF          RADIOLOGY & ADDITIONAL TESTS:    MEDICATIONS  (STANDING):  amLODIPine   Tablet 2.5 milliGRAM(s) Oral every 24 hours  apixaban 10 milliGRAM(s) Oral every 12 hours  carBAMazepine ER Capsule 400 milliGRAM(s) Oral <User Schedule>  levETIRAcetam 1500 milliGRAM(s) Oral <User Schedule>  levETIRAcetam 1000 milliGRAM(s) Oral <User Schedule>  lisinopril 40 milliGRAM(s) Oral every 24 hours    MEDICATIONS  (PRN):  acetaminophen     Tablet .. 650 milliGRAM(s) Oral every 6 hours PRN Temp greater or equal to 38C (100.4F), Mild Pain (1 - 3)  aluminum hydroxide/magnesium hydroxide/simethicone Suspension 30 milliLiter(s) Oral every 4 hours PRN Dyspepsia  melatonin 3 milliGRAM(s) Oral at bedtime PRN Insomnia  ondansetron Injectable 4 milliGRAM(s) IV Push every 8 hours PRN Nausea and/or Vomiting

## 2024-02-20 NOTE — DISCHARGE NOTE PROVIDER - CARE PROVIDER_API CALL
DEJUAN MCNEAL  [address] [facility] [place] [address]  Phone: [phone]  Fax: ()-  Follow Up Time: 1 week   Za Hyman  [address] [place] [address]  Phone: [phone]  Fax: (   )    -  Follow Up Time: 1 week

## 2024-02-20 NOTE — DISCHARGE NOTE PROVIDER - NSFTFHOMEHTHYNRD_GEN_ALL_CORE
Products Recommended: Elta line of sunscreen Detail Level: Generalized General Sunscreen Counseling: I recommended a broad spectrum sunscreen with a SPF of 30 or higher.  I explained that SPF 30 sunscreens block approximately 97 percent of the sun's harmful rays.  Sunscreens should be applied at least 15 minutes prior to expected sun exposure and then every 2 hours after that as long as sun exposure continues. If swimming or exercising sunscreen should be reapplied every 45 minutes to an hour after getting wet or sweating.  One ounce, or the equivalent of a shot glass full of sunscreen, is adequate to protect the skin not covered by a bathing suit. I also recommended a lip balm with a sunscreen as well. Sun protective clothing can be used in lieu of sunscreen but must be worn the entire time you are exposed to the sun's rays. Yes

## 2024-02-20 NOTE — DISCHARGE NOTE PROVIDER - PROVIDER TOKENS
PROVIDER:[TOKEN:[[id number]],FOLLOWUP:[1 week]] FREE:[LAST:[Hyman],FIRST:[Za],PHONE:[phone]],FAX:[(   )    -],ADDRESS:[[address] [place] [address]],FOLLOWUP:[1 week]]

## 2024-02-20 NOTE — DISCHARGE NOTE NURSING/CASE MANAGEMENT/SOCIAL WORK - NSDCPEFALRISK_GEN_ALL_CORE
For information on Fall & Injury Prevention, visit: [email or web address] OR  [email or web address] OR  https://www.cdc.gov/steadi/patient.html

## 2024-02-20 NOTE — PROGRESS NOTE ADULT - SUBJECTIVE AND OBJECTIVE BOX
*****INCOMPLETE NOTE*****    INTERVAL HPI/OVERNIGHT EVENTS:    SUBJECTIVE: Patient seen and examined at bedside, resting comfortably in bed, and does not appear to be in any acute distress. Patient states  Patient denies any recent fevers, chills, nausea, vomiting, headache, acute sob, chest pain, abdominal pain, genitourinary sx, extremity pain or swelling.     ANTIBIOTICS/RELEVANT:    MEDICATIONS  (STANDING):  amLODIPine   Tablet 2.5 milliGRAM(s) Oral every 24 hours  apixaban 10 milliGRAM(s) Oral every 12 hours  carBAMazepine ER Capsule 400 milliGRAM(s) Oral <User Schedule>  levETIRAcetam 1500 milliGRAM(s) Oral <User Schedule>  levETIRAcetam 1000 milliGRAM(s) Oral <User Schedule>  lisinopril 40 milliGRAM(s) Oral every 24 hours    MEDICATIONS  (PRN):  acetaminophen     Tablet .. 650 milliGRAM(s) Oral every 6 hours PRN Temp greater or equal to 38C (100.4F), Mild Pain (1 - 3)  aluminum hydroxide/magnesium hydroxide/simethicone Suspension 30 milliLiter(s) Oral every 4 hours PRN Dyspepsia  melatonin 3 milliGRAM(s) Oral at bedtime PRN Insomnia  ondansetron Injectable 4 milliGRAM(s) IV Push every 8 hours PRN Nausea and/or Vomiting      Vital Signs Last 24 Hrs  T(C): 37.1 (20 Feb 2024 05:49), Max: 37.4 (19 Feb 2024 23:53)  T(F): 98.8 (20 Feb 2024 05:49), Max: 99.3 (19 Feb 2024 23:53)  HR: 94 (20 Feb 2024 05:49) (79 - 94)  BP: 143/85 (20 Feb 2024 05:49) (103/62 - 165/93)  BP(mean): 109 (20 Feb 2024 01:46) (109 - 109)  RR: 18 (20 Feb 2024 05:49) (18 - 20)  SpO2: 97% (20 Feb 2024 05:49) (96% - 98%)    Parameters below as of 19 Feb 2024 23:53  Patient On (Oxygen Delivery Method): room air        PHYSICAL EXAM:  General: in no acute distress  Eyes: EOMI intact bilaterally. Anicteric sclerae, moist conjunctivae  HENT: Moist mucous membranes  Neck: Trachea midline, supple  Lungs: CTA B/L. No wheezes, rales, or rhonchi  Cardiovascular: RRR. No murmurs, rubs, or gallops  Abdomen: Soft, non-tender non-distended; No rebound or guarding  Extremities: WWP, No clubbing, cyanosis or edema  Neurological: Alert and oriented  Skin: Warm and dry. No obvious rash     LABS:                        11.9   6.99  )-----------( 166      ( 19 Feb 2024 14:40 )             36.5     02-19    136  |  104  |  21  ----------------------------<  135<H>  4.1   |  23  |  0.72    Ca    8.9      19 Feb 2024 14:40    TPro  7.4  /  Alb  3.7  /  TBili  0.3  /  DBili  0.2  /  AST  20  /  ALT  24  /  AlkPhos  64  02-19    PT/INR - ( 19 Feb 2024 20:39 )   PT: 16.0 sec;   INR: 1.42          PTT - ( 19 Feb 2024 20:39 )  PTT:27.4 sec  Urinalysis Basic - ( 19 Feb 2024 14:40 )    Color: Dark Yellow / Appearance: Cloudy / SG: >1.030 / pH: x  Gluc: 135 mg/dL / Ketone: Trace mg/dL  / Bili: Negative / Urobili: 1.0 mg/dL   Blood: x / Protein: 30 mg/dL / Nitrite: Negative   Leuk Esterase: Negative / RBC: 1 /HPF / WBC 3 /HPF   Sq Epi: x / Non Sq Epi: 8 /HPF / Bacteria: Negative /HPF        MICROBIOLOGY:    RADIOLOGY & ADDITIONAL STUDIES: *****INCOMPLETE NOTE*****    OVERNIGHT EVENTS: NAEO    SUBJECTIVE  Pt was seen and examined at bedside. Endorses 3 month history of cough with 5 day history of hemoptysis consisting of pinkish sputum and occasional bright red specks. Reports feeling better with recent adjustment of anti-epileptic medications. Denies any fever, chills, nausea, vomiting, headache, acute SOB, chest pain, abdominal pain, or genitourinary sx. Reports pain in RLE and wants something stronger than Tylenol. Reports Toradol in ED worked well.     Vital Signs Last 24 Hrs  T(C): 36.3 (20 Feb 2024 09:07), Max: 37.4 (19 Feb 2024 23:53)  T(F): 97.3 (20 Feb 2024 09:07), Max: 99.3 (19 Feb 2024 23:53)  HR: 92 (20 Feb 2024 09:07) (79 - 94)  BP: 163/96 (20 Feb 2024 09:07) (103/62 - 165/93)  BP(mean): 109 (20 Feb 2024 01:46) (109 - 109)  RR: 18 (20 Feb 2024 09:07) (18 - 20)  SpO2: 93% (20 Feb 2024 09:07) (93% - 98%)    Parameters below as of 20 Feb 2024 09:07  Patient On (Oxygen Delivery Method): room air    PHYSICAL EXAM     General: NAD  HEENT: NC/AT, scar evident on L paritotemporal scalp from fall; PERRL; Neck Supple; MMM  Respiratory: CTAB; no wheezes/rales/rhonchi, normal WOB  Cardiovascular: Regular rhythm/ rate, + S1/S2; no murmurs, rubs gallops   Gastrointestinal: Soft; NTND; bowel sounds normal and present  : no suprapubic tenderness  Vascular: extremities WWP; no edema/cyanosis, 2+ distal pulses b/l. Positive Penny sign on LLE  Neurological: A&Ox3, no obvious focal deficits  Integument: No gross skin abnormalities or rashes     LABS:                         12.1   6.96  )-----------( 182      ( 20 Feb 2024 05:30 )             37.0     02-19    136  |  104  |  21  ----------------------------<  135<H>  4.1   |  23  |  0.72    Ca    8.9      19 Feb 2024 14:40    TPro  7.4  /  Alb  3.7  /  TBili  0.3  /  DBili  0.2  /  AST  20  /  ALT  24  /  AlkPhos  64  02-19    PT/INR - ( 20 Feb 2024 05:30 )   PT: 12.4 sec;   INR: 1.09          PTT - ( 20 Feb 2024 05:30 )  PTT:28.5 sec  Urinalysis Basic - ( 19 Feb 2024 14:40 )    Color: Dark Yellow / Appearance: Cloudy / SG: >1.030 / pH: x  Gluc: 135 mg/dL / Ketone: Trace mg/dL  / Bili: Negative / Urobili: 1.0 mg/dL   Blood: x / Protein: 30 mg/dL / Nitrite: Negative   Leuk Esterase: Negative / RBC: 1 /HPF / WBC 3 /HPF   Sq Epi: x / Non Sq Epi: 8 /HPF / Bacteria: Negative /HPF            Lactate, Blood: 0.7 mmol/L (02-20 @ 05:30)      RADIOLOGY, EKG & ADDITIONAL TESTS: Reviewed.  OVERNIGHT EVENTS: RIGOBERTO    SUBJECTIVE  Pt was seen and examined at bedside. Endorses 3 month history of cough with 5 day history of hemoptysis consisting of pinkish sputum and occasional bright red specks. Reports feeling better with recent adjustment of anti-epileptic medications. Denies any fever, chills, nausea, vomiting, headache, acute SOB, chest pain, abdominal pain, or genitourinary sx. Reports pain in RLE and wants something stronger than Tylenol. Reports Toradol in ED worked well.     Vital Signs Last 24 Hrs  T(C): 36.3 (20 Feb 2024 09:07), Max: 37.4 (19 Feb 2024 23:53)  T(F): 97.3 (20 Feb 2024 09:07), Max: 99.3 (19 Feb 2024 23:53)  HR: 92 (20 Feb 2024 09:07) (79 - 94)  BP: 163/96 (20 Feb 2024 09:07) (103/62 - 165/93)  BP(mean): 109 (20 Feb 2024 01:46) (109 - 109)  RR: 18 (20 Feb 2024 09:07) (18 - 20)  SpO2: 93% (20 Feb 2024 09:07) (93% - 98%)    Parameters below as of 20 Feb 2024 09:07  Patient On (Oxygen Delivery Method): room air    PHYSICAL EXAM     General: NAD  HEENT: NC/AT, scar evident on L paritotemporal scalp from fall; PERRL; Neck Supple; MMM  Respiratory: CTAB; no wheezes/rales/rhonchi, normal WOB  Cardiovascular: Regular rhythm/ rate, + S1/S2; no murmurs, rubs gallops   Gastrointestinal: Soft; NTND; bowel sounds normal and present  : no suprapubic tenderness  Vascular: extremities WWP; no edema/cyanosis, 2+ distal pulses b/l. Positive Penny sign on LLE  Neurological: A&Ox3, no obvious focal deficits  Integument: No gross skin abnormalities or rashes     LABS:                         12.1   6.96  )-----------( 182      ( 20 Feb 2024 05:30 )             37.0     02-19    136  |  104  |  21  ----------------------------<  135<H>  4.1   |  23  |  0.72    Ca    8.9      19 Feb 2024 14:40    TPro  7.4  /  Alb  3.7  /  TBili  0.3  /  DBili  0.2  /  AST  20  /  ALT  24  /  AlkPhos  64  02-19    PT/INR - ( 20 Feb 2024 05:30 )   PT: 12.4 sec;   INR: 1.09          PTT - ( 20 Feb 2024 05:30 )  PTT:28.5 sec  Urinalysis Basic - ( 19 Feb 2024 14:40 )    Color: Dark Yellow / Appearance: Cloudy / SG: >1.030 / pH: x  Gluc: 135 mg/dL / Ketone: Trace mg/dL  / Bili: Negative / Urobili: 1.0 mg/dL   Blood: x / Protein: 30 mg/dL / Nitrite: Negative   Leuk Esterase: Negative / RBC: 1 /HPF / WBC 3 /HPF   Sq Epi: x / Non Sq Epi: 8 /HPF / Bacteria: Negative /HPF            Lactate, Blood: 0.7 mmol/L (02-20 @ 05:30)      RADIOLOGY, EKG & ADDITIONAL TESTS: Reviewed.

## 2024-02-20 NOTE — H&P ADULT - PROBLEM SELECTOR PLAN 1
#Chronic cough  CTA PE and A/P with IV contrast: Large PE with segmental/subsegmental extension b/l. No R heart strain. Opacities in posterior and lateral RLL may indicate evolving infarction/hemorrhage. Evidence of L DVT.   US LE: Acute DVT in L mid femoral vein, extends to L deep calf veins.   EKG: TWI in lead III, otherwise no TWI or ST depressions. Lead III TWI not present on previous EKG 2 weeks prior to this admission.   PESI score 63, Class I, Very low risk: 0-1.6% 30d mortality    Likely provoked PE iso 3 weeks of stasis iso medical leave from job and not feeling well 2/2 cough. No family hx clotting d/o, PE, DVT.     - Eliquis 10Q12 loading dose, 5Q12 after 1 week.   - F/u TTE  - Vasc surgery consulted, no acute indication for surgery. Appreciate recs #Chronic cough  CTA PE and A/P with IV contrast: Large PE with segmental/subsegmental extension b/l. No R heart strain. Opacities in posterior and lateral RLL may indicate evolving infarction/hemorrhage. Evidence of L DVT.   US LE: Acute DVT in L mid femoral vein, extends to L deep calf veins.   EKG: TWI in lead III, otherwise no TWI or ST depressions. Lead III TWI not present on previous EKG 2 weeks prior to this admission.   PESI score 63, Class I, Very low risk: 0-1.6% 30d mortality    Likely provoked PE iso 3 weeks of stasis iso medical leave from job and not feeling well 2/2 cough. No family hx clotting d/o, PE, DVT.     - Eliquis 10Q12 loading dose, 5Q12 after 1 week.   - Vasc surgery consulted, no acute indication for surgery. Appreciate recs

## 2024-02-20 NOTE — H&P ADULT - NSHPPHYSICALEXAM_GEN_ALL_CORE
PHYSICAL EXAM:  Constitutional: NAD, comfortable in bed.  HEENT: NC/AT, PERRLA, EOMI, no conjunctival pallor or scleral icterus, MMM  Neck: Supple, no JVD  Respiratory: CTA B/L. No w/r/r.   Cardiovascular: RRR, normal S1 and S2, no m/r/g.   Gastrointestinal: +BS, soft NTND, no guarding or rebound tenderness, no palpable masses   Extremities: wwp; no cyanosis, clubbing or edema. L posterior calf and popliteal fossa TTP. RLE NE, without TTP.   Vascular: Pulses equal and strong throughout.   Neurological: AAOx3, no CN deficits, strength and sensation intact throughout.   Skin: No gross skin abnormalities or rashes

## 2024-02-20 NOTE — DISCHARGE NOTE PROVIDER - NPI NUMBER (FOR SYSADMIN USE ONLY) :
[[id number]] [UNKNOWN] Purse String (Simple) Text: Given the location of the defect and the characteristics of the surrounding skin a purse string simple closure was deemed most appropriate.  Undermining was performed circumferentially around the surgical defect.  A purse string suture was then placed and tightened.

## 2024-02-20 NOTE — H&P ADULT - ATTENDING COMMENTS
52yo M with hx epilepsy, HTN who presented with calf pain and cough found to have LLE DVT and b/l pulmonary emboli with segmental/subsegmental extension bilaterally w/o signs of R heart strain    #DVT and B/l segmental PE  - likely provoked patient reports being largely bedbound for 1 month due to knee pain, cough  - Vascular consulted, no acute intervention  - transition heparin gtt to Eliquis 10mg BID started dose followed by 5mg  - will treat for 3mo and PCP follow up to determine ongoing need for AC, no known family hx of clots  - no signs of R heart strain on CT, trop wnl x2  - warm compresses for pain control of LLE    c/w remaining home medications as above

## 2024-02-20 NOTE — PROGRESS NOTE ADULT - PROBLEM SELECTOR PLAN 5
- C/w Amlodipine 2.5 Qd, and Lisinopril 40 Qd - C/w home Carbamazepine 400 Q12  - C/w Keppra 1000 Qd at 8am,1500 Qd at 8pm

## 2024-02-20 NOTE — DISCHARGE NOTE PROVIDER - NSDCMRMEDTOKEN_GEN_ALL_CORE_FT
amLODIPine 2.5 mg oral tablet: 1 tab(s) orally once a day  benzonatate 100 mg oral capsule: 1 cap(s) orally 3 times a day as needed for  cough  carBAMazepine 200 mg oral capsule, extended release: 2 cap(s) orally 2 times a day Take at 6 am and 6 pm  guaiFENesin 600 mg oral tablet, extended release: 1 tab(s) orally every 12 hours Take for 3 days  Keppra 1000 mg oral tablet: 1000 milligram(s) orally in the morning (8AM)  1500 milligrams(s) orally in the evening (8PM)   lisinopril 40 mg oral tablet: 1 tab(s) orally once a day   amLODIPine 2.5 mg oral tablet: 1 tab(s) orally once a day  benzonatate 100 mg oral capsule: 1 cap(s) orally 3 times a day as needed for  cough  carBAMazepine 200 mg oral capsule, extended release: 2 cap(s) orally 2 times a day Take at 6 am and 6 pm  Eliquis Starter Pack for Treatment of DVT and PE 5 mg oral tablet: 2 tab(s) orally every 12 hours Eliquis starter pack. Pt received one dose here at 9AM. Please take one dose in the evening.  guaiFENesin 600 mg oral tablet, extended release: 1 tab(s) orally every 12 hours Take for 3 days  Keppra 1000 mg oral tablet: 1000 milligram(s) orally in the morning (8AM)  1500 milligrams(s) orally in the evening (8PM)   lisinopril 40 mg oral tablet: 1 tab(s) orally once a day   amLODIPine 2.5 mg oral tablet: 1 tab(s) orally once a day  benzonatate 100 mg oral capsule: 1 cap(s) orally 3 times a day as needed for  cough  carBAMazepine 200 mg oral capsule, extended release: 2 cap(s) orally 2 times a day Take at 6 am and 6 pm  Eliquis Starter Pack for Treatment of DVT and PE 5 mg oral tablet: 2 tab(s) orally every 12 hours Eliquis starter pack. Pt received one dose here at 9AM. Please take one dose in the evening.  guaiFENesin 600 mg oral tablet, extended release: 1 tab(s) orally every 12 hours Take for 3 days  Keppra 1000 mg oral tablet: 1000 milligram(s) orally in the morning (8AM)  1500 milligrams(s) orally in the evening (8PM)   lidocaine 4% topical cream: Apply topically to affected area once a day  lisinopril 40 mg oral tablet: 1 tab(s) orally once a day

## 2024-02-20 NOTE — PATIENT PROFILE ADULT - FALL HARM RISK - HARM RISK INTERVENTIONS
Assistance with ambulation/Assistance OOB with selected safe patient handling equipment/Communicate Risk of Fall with Harm to all staff/Discuss with provider need for PT consult/Monitor gait and stability/Reinforce activity limits and safety measures with patient and family/Tailored Fall Risk Interventions/Visual Cue: Yellow wristband and red socks/Bed in lowest position, wheels locked, appropriate side rails in place/Call bell, personal items and telephone in reach/Instruct patient to call for assistance before getting out of bed or chair/Non-slip footwear when patient is out of bed/[place] to call system/Physically safe environment - no spills, clutter or unnecessary equipment/Purposeful Proactive Rounding/Room/bathroom lighting operational, light cord in reach

## 2024-02-20 NOTE — PROGRESS NOTE ADULT - PROBLEM SELECTOR PLAN 4
- C/w home Carbamazepine 400 Q12  - C/w Keppra 1000 Qd at 8am,1500 Qd at 8pm Hgb 11.9, MCV 86 (Baseline Hgb 14)  Potentially hemodilutional, s/p 2L NS.   Bilirubin WNL, , WNL. Haptoglobin 446. Unlikely hemolysis, however hemolysis possible iso large clot burden in DVT and PE.   - Transfuse PRN if Hgb continues to downtrend

## 2024-02-20 NOTE — DISCHARGE NOTE PROVIDER - NSDCFUSCHEDAPPT_GEN_ALL_CORE_FT
[facility]  [facility] [address]  Scheduled Appointment: 02/21/2024    Jimmy Langston Physician [facility], 1928–[facility]  [facility] [address] [place] [address]  Scheduled Appointment: 02/23/2024

## 2024-02-20 NOTE — PROGRESS NOTE ADULT - ASSESSMENT
53M PMHx epilepsy, HTN p/w likely provoked L DVT and PE.  53M PMHx epilepsy, HTN p/w likely provoked L DVT and PE in the setting of subacute/chronic cough 53M PMHx epilepsy, HTN p/w likely provoked L DVT and PE in the setting of subacute/chronic cough and decreased mobility, admitted for PE anticoagulation and evaluation for possible interventions.

## 2024-02-20 NOTE — PROGRESS NOTE ADULT - PROBLEM SELECTOR PLAN 2
Full dose AC with Eliquis as above    - Vasc surg recs appreciated Full dose AC with Eliquis as above.    - Vasc surg recs appreciated, no surgical interventions indicated

## 2024-02-21 ENCOUNTER — APPOINTMENT (OUTPATIENT)
Dept: HEART AND VASCULAR | Facility: CLINIC | Age: 54
End: 2024-02-21

## 2024-02-23 ENCOUNTER — APPOINTMENT (OUTPATIENT)
Dept: INTERNAL MEDICINE | Facility: CLINIC | Age: 54
End: 2024-02-23

## 2024-02-26 ENCOUNTER — APPOINTMENT (OUTPATIENT)
Dept: HEART AND VASCULAR | Facility: CLINIC | Age: 54
End: 2024-02-26
Payer: COMMERCIAL

## 2024-02-26 ENCOUNTER — NON-APPOINTMENT (OUTPATIENT)
Age: 54
End: 2024-02-26

## 2024-02-26 VITALS
BODY MASS INDEX: 32.47 KG/M2 | HEIGHT: 73 IN | DIASTOLIC BLOOD PRESSURE: 71 MMHG | HEART RATE: 84 BPM | RESPIRATION RATE: 15 BRPM | OXYGEN SATURATION: 97 % | SYSTOLIC BLOOD PRESSURE: 107 MMHG | WEIGHT: 245 LBS

## 2024-02-26 DIAGNOSIS — E66.9 OBESITY, UNSPECIFIED: ICD-10-CM

## 2024-02-26 PROCEDURE — 93000 ELECTROCARDIOGRAM COMPLETE: CPT

## 2024-02-26 PROCEDURE — 99205 OFFICE O/P NEW HI 60 MIN: CPT | Mod: 25

## 2024-02-26 RX ORDER — AMLODIPINE BESYLATE 2.5 MG/1
2.5 TABLET ORAL
Refills: 0 | Status: DISCONTINUED | COMMUNITY
End: 2024-02-26

## 2024-02-26 NOTE — CARDIOLOGY SUMMARY
[de-identified] : 2/26/24: NSR at 79 bpm [de-identified] : CT Chest 2/19/24: 1.  Large pulmonary emboli with segmental/subsegmental extension bilaterally. There is no right heart strain. Confluent opacities in the posterior and lateral right lower lobes may indicate evolving infarction/hemorrhage. 2.  Small right and trace left pleural effusions. 3.  Evaluation of the central venous system is limited by suboptimal contrast timing. Within these limitations, there is a left common femoral venous filling defect corresponding to findings on same-day duplex venous sonography. No convincing filling defects of the common or external femoral veins. 4.  Mild fat stranding around the proximal right thigh musculature. Correlate clinically. [de-identified] : Venous u/s 2/19/24: Acute occlusive deep venous thrombosis within the left mid femoral vein extending inferiorly to the left deep calf veins.

## 2024-02-26 NOTE — PHYSICAL EXAM
[No Acute Distress] : no acute distress [Obese] : obese [Normal Conjunctiva] : normal conjunctiva [Normal Venous Pressure] : normal venous pressure [No Carotid Bruit] : no carotid bruit [Normal S1, S2] : normal S1, S2 [No Murmur] : no murmur [No Rub] : no rub [No Gallop] : no gallop [Clear Lung Fields] : clear lung fields [Good Air Entry] : good air entry [Soft] : abdomen soft [No Respiratory Distress] : no respiratory distress  [Non Tender] : non-tender [Normal Bowel Sounds] : normal bowel sounds [No Masses/organomegaly] : no masses/organomegaly [No Edema] : no edema [Normal Gait] : normal gait [No Cyanosis] : no cyanosis [No Clubbing] : no clubbing [No Varicosities] : no varicosities [No Rash] : no rash [No Skin Lesions] : no skin lesions [Moves all extremities] : moves all extremities [No Focal Deficits] : no focal deficits [Normal Speech] : normal speech [Alert and Oriented] : alert and oriented [Normal memory] : normal memory

## 2024-02-26 NOTE — HISTORY OF PRESENT ILLNESS
[FreeTextEntry1] : 53-year-old man, with a past medical history of hypertension and diabetes, presenting for syncope.  Approximately 10 days ago, the patient visited his primary care doctor and expressed that he had a cough of 2 months duration.  1 week prior to the visit he states that he had a fall during which he sustained cephalic trauma and his wife believes that he lost consciousness.  Given this he visited the [facility] ED and underwent a workup that included a CT head with no signs of intracranial hemorrhage.   1 week prior to this visit, the patient again had a syncopal episode and was admitted to [facility] for PE. As per the discharge summary: "53M PMHx epilepsy, HTN p/w likely provoked L DVT and PE in the setting of subacute/chronic cough. CTA PE and A/P with IV contrast: Large PE with segmental/subsegmental extension b/l. No R heart strain. Opacities in posterior and lateral RLL may indicate evolving infarction/hemorrhage. Evidence of L DVT. US LE: Acute DVT in L mid femoral vein, extends to L deep calf veins. EKG: TWI in lead III, otherwise no TWI or ST depressions. Lead III TWI not present on previous EKG 2 weeks prior to this admission. PESI score 63, Class I, Very low risk: 0-1.6% 30d mortality. Likely provoked PE iso 3 weeks of stasis iso medical leave from job and not feeling well 2/2 cough. No family hx clotting d/o, PE, DVT. Cardiac enzymes wnl. Vascular surgery consulted, no acute indication for surgery." Since then, he has been taking Eliquis 10mg Q12h for a loading dose that will be completed tomorrow and to be followed by 5mg Q12h. Presently, he notes improvement in cough and LE pain, but has had LUE pain in a previously dislocated shoulder since his hospitalization. He notes that heatpacks improve this pain. Otherwise, he denies CP, palpitations, PND, orthopnea, HAs, abdominal pain, and LE swelling.

## 2024-02-26 NOTE — ASSESSMENT
[FreeTextEntry1] : 53-year-old man, with a past medical history of hypertension and diabetes, presenting for syncope.    Syncope: Patient diagnosed with PE during recent hospitalization at [facility]. Since then, he has been taking Eliquis 10mg Q12h for a loading dose that will be completed tomorrow and to be followed by 5mg Q12h. This is the etiology of his presenting syncope. Unremarkable ECG at this time and the fact that cardiac enzymes at the time of the hospitalization were negative point away from primary cardiogenic etiologies. - continue oral anticoagulation with apixaban 5mg PO BID - will attempt to obtain TTE collateral as report not available at this time - patient to f/u with pulmonology for further counseling regarding PE  HTN: BP controlled, and borderline low at this time. - will hold amLODIPine Besylate 2.5 MG Oral Tablet at this time - continue Lisinopril 40 MG Oral Tablet PO QDay  Obesity: BMI 32.32 kg/m2.  - lifestyle modifications (diet and exercise)  - weight loss counseling - increase aerobic exercise as tolerated to aim for approximately 30 minutes of activity 5 days a week - heart healthy diet low in sodium, sugars and saturated fats and high in fruits, vegetables and whole grains  F/u in 1 month.

## 2024-02-27 DIAGNOSIS — I26.93 SINGLE SUBSEGMENTAL PULMONARY EMBOLISM WITHOUT ACUTE COR PULMONALE: ICD-10-CM

## 2024-02-27 DIAGNOSIS — M79.662 PAIN IN LEFT LOWER LEG: ICD-10-CM

## 2024-02-27 DIAGNOSIS — I26.99 OTHER PULMONARY EMBOLISM WITHOUT ACUTE COR PULMONALE: ICD-10-CM

## 2024-02-27 DIAGNOSIS — I82.412 ACUTE EMBOLISM AND THROMBOSIS OF LEFT FEMORAL VEIN: ICD-10-CM

## 2024-02-27 DIAGNOSIS — R05.3 CHRONIC COUGH: ICD-10-CM

## 2024-02-27 DIAGNOSIS — D64.9 ANEMIA, UNSPECIFIED: ICD-10-CM

## 2024-02-27 DIAGNOSIS — Z74.01 BED CONFINEMENT STATUS: ICD-10-CM

## 2024-02-27 DIAGNOSIS — Z79.01 LONG TERM (CURRENT) USE OF ANTICOAGULANTS: ICD-10-CM

## 2024-02-27 DIAGNOSIS — I10 ESSENTIAL (PRIMARY) HYPERTENSION: ICD-10-CM

## 2024-02-27 DIAGNOSIS — G40.909 EPILEPSY, UNSPECIFIED, NOT INTRACTABLE, WITHOUT STATUS EPILEPTICUS: ICD-10-CM

## 2024-02-27 DIAGNOSIS — R04.2 HEMOPTYSIS: ICD-10-CM

## 2024-02-27 DIAGNOSIS — I87.8 OTHER SPECIFIED DISORDERS OF VEINS: ICD-10-CM

## 2024-02-28 ENCOUNTER — APPOINTMENT (OUTPATIENT)
Dept: FAMILY MEDICINE | Facility: CLINIC | Age: 54
End: 2024-02-28
Payer: COMMERCIAL

## 2024-02-28 ENCOUNTER — APPOINTMENT (OUTPATIENT)
Dept: HEART AND VASCULAR | Facility: CLINIC | Age: 54
End: 2024-02-28

## 2024-02-28 ENCOUNTER — INPATIENT (INPATIENT)
Facility: HOSPITAL | Age: 54
LOS: 1 days | Discharge: HOME | DRG: 312 | End: 2024-03-01
Attending: INTERNAL MEDICINE | Admitting: STUDENT IN AN ORGANIZED HEALTH CARE EDUCATION/TRAINING PROGRAM
Payer: COMMERCIAL

## 2024-02-28 VITALS
HEIGHT: 73 IN | SYSTOLIC BLOOD PRESSURE: 121 MMHG | DIASTOLIC BLOOD PRESSURE: 78 MMHG | OXYGEN SATURATION: 97 % | RESPIRATION RATE: 14 BRPM | TEMPERATURE: 97.8 F | BODY MASS INDEX: 32.34 KG/M2 | WEIGHT: 244 LBS | HEART RATE: 87 BPM

## 2024-02-28 VITALS
HEIGHT: 73 IN | SYSTOLIC BLOOD PRESSURE: 110 MMHG | HEART RATE: 74 BPM | WEIGHT: 240.08 LBS | DIASTOLIC BLOOD PRESSURE: 64 MMHG | TEMPERATURE: 98 F | RESPIRATION RATE: 18 BRPM | OXYGEN SATURATION: 97 %

## 2024-02-28 DIAGNOSIS — M25.519 PAIN IN UNSPECIFIED SHOULDER: ICD-10-CM

## 2024-02-28 DIAGNOSIS — R55 SYNCOPE AND COLLAPSE: ICD-10-CM

## 2024-02-28 DIAGNOSIS — G40.909 EPILEPSY, UNSPECIFIED, NOT INTRACTABLE, WITHOUT STATUS EPILEPTICUS: ICD-10-CM

## 2024-02-28 DIAGNOSIS — I26.99 OTHER PULMONARY EMBOLISM WITHOUT ACUTE COR PULMONALE: ICD-10-CM

## 2024-02-28 DIAGNOSIS — Z29.9 ENCOUNTER FOR PROPHYLACTIC MEASURES, UNSPECIFIED: ICD-10-CM

## 2024-02-28 DIAGNOSIS — I10 ESSENTIAL (PRIMARY) HYPERTENSION: ICD-10-CM

## 2024-02-28 LAB
ALBUMIN SERPL ELPH-MCNC: 4 G/DL — SIGNIFICANT CHANGE UP (ref 3.3–5)
ALP SERPL-CCNC: 74 U/L — SIGNIFICANT CHANGE UP (ref 40–120)
ALT FLD-CCNC: 55 U/L — HIGH (ref 10–45)
ANION GAP SERPL CALC-SCNC: 14 MMOL/L — SIGNIFICANT CHANGE UP (ref 5–17)
ANISOCYTOSIS BLD QL: SLIGHT — SIGNIFICANT CHANGE UP
APTT BLD: 30.6 SEC — SIGNIFICANT CHANGE UP (ref 24.5–35.6)
AST SERPL-CCNC: 30 U/L — SIGNIFICANT CHANGE UP (ref 10–40)
BASOPHILS # BLD AUTO: 0 K/UL — SIGNIFICANT CHANGE UP (ref 0–0.2)
BASOPHILS NFR BLD AUTO: 0 % — SIGNIFICANT CHANGE UP (ref 0–2)
BILIRUB SERPL-MCNC: 0.2 MG/DL — SIGNIFICANT CHANGE UP (ref 0.2–1.2)
BUN SERPL-MCNC: 18 MG/DL — SIGNIFICANT CHANGE UP (ref 7–23)
CALCIUM SERPL-MCNC: 9.2 MG/DL — SIGNIFICANT CHANGE UP (ref 8.4–10.5)
CHLORIDE SERPL-SCNC: 102 MMOL/L — SIGNIFICANT CHANGE UP (ref 96–108)
CO2 SERPL-SCNC: 25 MMOL/L — SIGNIFICANT CHANGE UP (ref 22–31)
CREAT SERPL-MCNC: 0.89 MG/DL — SIGNIFICANT CHANGE UP (ref 0.5–1.3)
EGFR: 102 ML/MIN/1.73M2 — SIGNIFICANT CHANGE UP
EOSINOPHIL # BLD AUTO: 0.37 K/UL — SIGNIFICANT CHANGE UP (ref 0–0.5)
EOSINOPHIL NFR BLD AUTO: 4.4 % — SIGNIFICANT CHANGE UP (ref 0–6)
GLUCOSE SERPL-MCNC: 114 MG/DL — HIGH (ref 70–99)
HCT VFR BLD CALC: 41.4 % — SIGNIFICANT CHANGE UP (ref 39–50)
HGB BLD-MCNC: 13.2 G/DL — SIGNIFICANT CHANGE UP (ref 13–17)
INR BLD: 1.23 — HIGH (ref 0.85–1.18)
LYMPHOCYTES # BLD AUTO: 1.6 K/UL — SIGNIFICANT CHANGE UP (ref 1–3.3)
LYMPHOCYTES # BLD AUTO: 19.1 % — SIGNIFICANT CHANGE UP (ref 13–44)
MANUAL SMEAR VERIFICATION: SIGNIFICANT CHANGE UP
MCHC RBC-ENTMCNC: 28.1 PG — SIGNIFICANT CHANGE UP (ref 27–34)
MCHC RBC-ENTMCNC: 31.9 GM/DL — LOW (ref 32–36)
MCV RBC AUTO: 88.3 FL — SIGNIFICANT CHANGE UP (ref 80–100)
MICROCYTES BLD QL: SLIGHT — SIGNIFICANT CHANGE UP
MONOCYTES # BLD AUTO: 0.36 K/UL — SIGNIFICANT CHANGE UP (ref 0–0.9)
MONOCYTES NFR BLD AUTO: 4.3 % — SIGNIFICANT CHANGE UP (ref 2–14)
MYELOCYTES NFR BLD: 0.9 % — HIGH (ref 0–0)
NEUTROPHILS # BLD AUTO: 5.97 K/UL — SIGNIFICANT CHANGE UP (ref 1.8–7.4)
NEUTROPHILS NFR BLD AUTO: 71.3 % — SIGNIFICANT CHANGE UP (ref 43–77)
NT-PROBNP SERPL-SCNC: <36 PG/ML — SIGNIFICANT CHANGE UP (ref 0–300)
OVALOCYTES BLD QL SMEAR: SLIGHT — SIGNIFICANT CHANGE UP
PLAT MORPH BLD: NORMAL — SIGNIFICANT CHANGE UP
PLATELET # BLD AUTO: 317 K/UL — SIGNIFICANT CHANGE UP (ref 150–400)
POIKILOCYTOSIS BLD QL AUTO: SLIGHT — SIGNIFICANT CHANGE UP
POLYCHROMASIA BLD QL SMEAR: SLIGHT — SIGNIFICANT CHANGE UP
POTASSIUM SERPL-MCNC: 4.3 MMOL/L — SIGNIFICANT CHANGE UP (ref 3.5–5.3)
POTASSIUM SERPL-SCNC: 4.3 MMOL/L — SIGNIFICANT CHANGE UP (ref 3.5–5.3)
PROT SERPL-MCNC: 7.7 G/DL — SIGNIFICANT CHANGE UP (ref 6–8.3)
PROTHROM AB SERPL-ACNC: 13.9 SEC — HIGH (ref 9.5–13)
RBC # BLD: 4.69 M/UL — SIGNIFICANT CHANGE UP (ref 4.2–5.8)
RBC # FLD: 14 % — SIGNIFICANT CHANGE UP (ref 10.3–14.5)
RBC BLD AUTO: ABNORMAL
SODIUM SERPL-SCNC: 141 MMOL/L — SIGNIFICANT CHANGE UP (ref 135–145)
TROPONIN T, HIGH SENSITIVITY RESULT: 12 NG/L — SIGNIFICANT CHANGE UP (ref 0–51)
WBC # BLD: 8.38 K/UL — SIGNIFICANT CHANGE UP (ref 3.8–10.5)
WBC # FLD AUTO: 8.38 K/UL — SIGNIFICANT CHANGE UP (ref 3.8–10.5)

## 2024-02-28 PROCEDURE — 71046 X-RAY EXAM CHEST 2 VIEWS: CPT | Mod: 26

## 2024-02-28 PROCEDURE — 99285 EMERGENCY DEPT VISIT HI MDM: CPT

## 2024-02-28 PROCEDURE — 99214 OFFICE O/P EST MOD 30 MIN: CPT

## 2024-02-28 PROCEDURE — 99223 1ST HOSP IP/OBS HIGH 75: CPT | Mod: GC

## 2024-02-28 PROCEDURE — G2211 COMPLEX E/M VISIT ADD ON: CPT | Mod: NC,1L

## 2024-02-28 RX ORDER — LEVETIRACETAM 250 MG/1
1000 TABLET, FILM COATED ORAL EVERY 12 HOURS
Refills: 0 | Status: DISCONTINUED | OUTPATIENT
Start: 2024-02-28 | End: 2024-03-01

## 2024-02-28 RX ORDER — LEVETIRACETAM 250 MG/1
1500 TABLET, FILM COATED ORAL ONCE
Refills: 0 | Status: COMPLETED | OUTPATIENT
Start: 2024-02-28 | End: 2024-02-28

## 2024-02-28 RX ORDER — CARBAMAZEPINE 200 MG
400 TABLET ORAL EVERY 12 HOURS
Refills: 0 | Status: DISCONTINUED | OUTPATIENT
Start: 2024-02-29 | End: 2024-03-01

## 2024-02-28 RX ORDER — CARBAMAZEPINE 200 MG
400 TABLET ORAL ONCE
Refills: 0 | Status: COMPLETED | OUTPATIENT
Start: 2024-02-28 | End: 2024-02-28

## 2024-02-28 RX ORDER — LEVETIRACETAM 250 MG/1
500 TABLET, FILM COATED ORAL EVERY 24 HOURS
Refills: 0 | Status: DISCONTINUED | OUTPATIENT
Start: 2024-02-28 | End: 2024-03-01

## 2024-02-28 RX ORDER — ENOXAPARIN SODIUM 100 MG/ML
100 INJECTION SUBCUTANEOUS EVERY 12 HOURS
Refills: 0 | Status: DISCONTINUED | OUTPATIENT
Start: 2024-02-29 | End: 2024-03-01

## 2024-02-28 RX ORDER — ENOXAPARIN SODIUM 100 MG/ML
100 INJECTION SUBCUTANEOUS ONCE
Refills: 0 | Status: COMPLETED | OUTPATIENT
Start: 2024-02-28 | End: 2024-02-28

## 2024-02-28 RX ADMIN — LEVETIRACETAM 1500 MILLIGRAM(S): 250 TABLET, FILM COATED ORAL at 19:11

## 2024-02-28 RX ADMIN — Medication 400 MILLIGRAM(S): at 19:11

## 2024-02-28 RX ADMIN — ENOXAPARIN SODIUM 100 MILLIGRAM(S): 100 INJECTION SUBCUTANEOUS at 19:12

## 2024-02-28 NOTE — ED ADULT TRIAGE NOTE - CHIEF COMPLAINT QUOTE
confirmed PE 10x ago @ [facility], placed on ACs, went to  for f/u, had near syncopal episode where he became diaphoretic and pale, shaky, and weak, SBP went to 60s. Responded to IVF and returned to baseline, then occurred again wiuth EMS, without position change or identifiable cause. Currently normotensive, awake, alert, oriented. EKG done, denies cp, SOB. Never full LOC. BGl done, 143 with EMS. No cards hx.

## 2024-02-28 NOTE — H&P ADULT - ATTENDING COMMENTS
#presyncope: likely 2/2 orthostatic hypotension, felt lightheaded after standing.  No seizure activity noted. EKG nsr, trops neg. Unlikely 2/2 worsening PE. No evidence of RHS, pt is HDS and on RA. Trops, bnp negative. Orthostatic negative s/p fluids. F/up repeat in am. Holding BP meds for now-restart as tolerated. F/up TTE.      #PE: no evidence of worsening PE or RHS. As above f/up TTE. Reasonable to switch to lovenox given drug interaction w/ carbamazepine. F/up TTE, heme onc consult

## 2024-02-28 NOTE — ED ADULT NURSE NOTE - NSFALLHARMRISKINTERV_ED_ALL_ED
Assistance OOB with selected safe patient handling equipment if applicable/Communicate risk of Fall with Harm to all staff, patient, and family/Orthostatic vital signs/Provide visual cue: red socks, yellow wristband, yellow gown, etc/Reinforce activity limits and safety measures with patient and family/Bed in lowest position, wheels locked, appropriate side rails in place/Call bell, personal items and telephone in reach/Instruct patient to call for assistance before getting out of bed/chair/stretcher/Non-slip footwear applied when patient is off stretcher/[place] to call system/Physically safe environment - no spills, clutter or unnecessary equipment/Purposeful Proactive Rounding/Room/bathroom lighting operational, light cord in reach

## 2024-02-28 NOTE — H&P ADULT - ASSESSMENT
53M PMHx epilepsy, HTN recent PE diagnosed presnts with presyncopal episode from PCP office admitted for further management.

## 2024-02-28 NOTE — PHYSICAL EXAM
[Normal] : normal gait, coordination grossly intact, no focal deficits and deep tendon reflexes were 2+ and symmetric [Speech Grossly Normal] : speech grossly normal [Memory Grossly Normal] : memory grossly normal [Normal Insight/Judgement] : insight and judgment were intact [de-identified] : seizures (repeated nodding of the head, staring).   [de-identified] : lt shoulder pain, lt deltoid arm pain, no swelling.

## 2024-02-28 NOTE — H&P ADULT - PROBLEM SELECTOR PLAN 1
patient with episode of presyncope today earlier at pcp office  was recently discharged on eliquis for PE (see below problem). eliquis possible not adequate tx due to [id number] induction from cabamazepine causing eliquis to not be sufficient tx. could have RHS now causing presyncopal episode  has no other episodes of dizziness besides earlier today.  had become hypotensive during the episode. had taken BP meds this AM as well  in our ED orthostatics were negative  EKG NSR, trops and BNP negative. does not sound like seizure like behavior  -hold BP meds at this time  -f/u TTE patient with episode of presyncope today earlier at pcp office  was recently discharged on eliquis for PE (see below problem). eliquis possible not adequate tx due to [id number] induction from cabamazepine causing eliquis to not be sufficient tx. could have RHS now causing presyncopal episode  has no other episodes of dizziness besides earlier today.  had become hypotensive during the episode. had taken BP meds this AM as well  in our ED orthostatics were negative  EKG NSR, trops and BNP negative. does not sound like seizure like behavior  -hold BP meds at this time  - repeat orthostatics in am   -f/u TTE

## 2024-02-28 NOTE — H&P ADULT - NSHPLABSRESULTS_GEN_ALL_CORE
.  LABS:                         13.2   8.38  )-----------( 317      ( 28 Feb 2024 18:03 )             41.4     02-28    141  |  102  |  18  ----------------------------<  114<H>  4.3   |  25  |  0.89    Ca    9.2      28 Feb 2024 18:03    TPro  7.7  /  Alb  4.0  /  TBili  0.2  /  DBili  x   /  AST  30  /  ALT  55<H>  /  AlkPhos  74  02-28    PT/INR - ( 28 Feb 2024 18:03 )   PT: 13.9 sec;   INR: 1.23          PTT - ( 28 Feb 2024 18:03 )  PTT:30.6 sec  Urinalysis Basic - ( 28 Feb 2024 18:03 )    Color: x / Appearance: x / SG: x / pH: x  Gluc: 114 mg/dL / Ketone: x  / Bili: x / Urobili: x   Blood: x / Protein: x / Nitrite: x   Leuk Esterase: x / RBC: x / WBC x   Sq Epi: x / Non Sq Epi: x / Bacteria: x            RADIOLOGY, EKG & ADDITIONAL TESTS: Reviewed.

## 2024-02-28 NOTE — H&P ADULT - PROBLEM SELECTOR PLAN 3
patient with a hx of HTN on home amlodipine & lisinopril  -hold home bp meds i/s/o hypotensive presyncopal episode. reinstate gradually as needed

## 2024-02-28 NOTE — H&P ADULT - PROBLEM SELECTOR PLAN 5
F: None  E: Replete PRN  N: regular diet  DVT: Full dose lovenox  Code: Full.  Dispo: [facility]

## 2024-02-28 NOTE — ED ADULT NURSE NOTE - CHIEF COMPLAINT QUOTE
confirmed PE 10x ago @ [facility], placed on ACs, went to  for f/u, had near syncopal episode where he became diaphoretic and pale, shaky, and weak, SBP went to 60s. Responded to IVF and returned to baseline, then occurred again wiuth EMS, without position change or identifiable cause. Currently normotensive, awake, alert, oriented. EKG done, denies cp, SOB. Never full LOC. BGl done, 143 with EMS. No cards hx.

## 2024-02-28 NOTE — HISTORY OF PRESENT ILLNESS
[de-identified] : 53 year old male with history of Epilepsy, HTN presents s/p hospitalization for DVT/PE.  He is on Eliquis 10mg bid and LLE swelling has significantly decreased.  He saw Cardio and was instructed to DC amlodipine but patient has continued to take both Lisinopril and Amlodipine.  He noted feeling dizziness and lightheaded.  He has several witnessed seizures in the examining room and wife called Neuro who has facetimed him.  Labs from hospitalization reviewed.

## 2024-02-28 NOTE — H&P ADULT - HISTORY OF PRESENT ILLNESS
53M PMHx epilepsy, HTN presnts with presyncopal episode from PCP office. Patient was recently admitted 1 week ago found to have provoked DVT/ PE- CT scan showing no R heart strain and was discharged on eliquis. Patient was complaint with medication and went to follow up with PCP regarding shoulder pain and at the end of his visit when he tried to stand he became dizzy - and hypotensive. He was brought into the ED where he felt better already and had no dizziness, shoulder pain or other symptoms. No loss of consciousness, or seizures recently. All other ROS negative.    ED Vitals: T98, HR 74 /64 SPO2 97 on RA  ED Labs: WBC 8 Hg 13 Plt 317 PT 13.9 INR 1.23 PTT 30 Na 141 K 4.3 Cl 102 Bicarb 25 AG 14 BUN 18 Cr 0.89 Calcium 9.2 Trop T 12 BNP <36   Imaging: none  Intervention: tegretol, lovenox 100, keppra 1500

## 2024-02-28 NOTE — ED PROVIDER NOTE - PHYSICAL EXAMINATION
CONSTITUTIONAL: Well-appearing; in no apparent distress.   HEAD: Normocephalic; atraumatic.   EYES:  conjunctiva and sclera clear  ENT: normal nose; no rhinorrhea; normal pharynx with no erythema or lesions.   NECK: Supple; non-tender;   CARDIOVASCULAR: Normal S1, S2; no murmurs, rubs, or gallops. Regular rate and rhythm.   RESPIRATORY: Breathing easily; breath sounds clear and equal bilaterally; no wheezes, rhonchi, or rales.  GI: Soft; non-distended; non-tender; no palpable organomegaly.   EXT: No cyanosis , mild 1+ edema  SKIN: Normal for age and race; warm; dry; good turgor; no apparent lesions or rash.   NEURO: A & O x 3; face symmetric; grossly unremarkable.   PSYCHOLOGICAL: The patient’s mood and manner are appropriate.

## 2024-02-28 NOTE — PLAN
[FreeTextEntry1] : Lt shoulder pain for 2 weeks- xrays, will consider PT if no improvement Epilepsy- FU with Neuro PE/DVT- continue Eliquis, FU with Pulm HTN- DC amlodipine, continue Lisinopril 40mg daily Add:  BP 60/44 sitting, patient appears diaphoretic.  EMS called and transported

## 2024-02-28 NOTE — H&P ADULT - PROBLEM SELECTOR PLAN 2
patient with a hx of PE diagnosed 1 week ago  was discharged on eliquis, at the time BNP Trops negative and no RHS on CT scan  patient was compliant with eliquis, likely was interaction of CY induction from cabamazepine causing eliquis to not be sufficient tx  switched to lovenox in ED today. could have been cause of presyncopal episode  -f/u TTE  -c/w lovenox full dose - 100 BID patient with a hx of PE diagnosed 1 week ago  was discharged on eliquis, at the time BNP Trops negative and no RHS on CT scan  patient was compliant with eliquis, likely was interaction of CY induction from cabamazepine causing eliquis to not be sufficient tx  switched to lovenox in ED today. could have been cause of presyncopal episode  -f/u TTE  -c/w lovenox full dose - 100 BID  - heme onc consult

## 2024-02-28 NOTE — H&P ADULT - PROBLEM SELECTOR PLAN 4
patient with hx of epilepsy  last seizure was after recent admission to [facility] for likely URI, at the time AED were uptitrated  - C/w home Carbamazepine 400 Q12  - C/w Keppra 1000 Qd at 8am,1500 Qd at 8pm.

## 2024-02-28 NOTE — ED ADULT NURSE NOTE - OBJECTIVE STATEMENT
Pt is a 52y/o M sent in from  following near-syncopal episode, BP in "60s," dizziness/lightheadedness, diaphoresis, pale, shaky, weak. -head strike, -LOC, +thinners. Pt given fluids w/ EMS, return to baseline. Pt currently denies CP, SOB, fever/chills, N/V/D, numbness/tingling, HA, blurry vision. Pt has endorses diff sleeping, POTTS following dx L-sided PE @ [facility] 10d ago, been compliant w/ prescribed Eliquis. Pt has PMHx epilepsy, has not had seizure episode in multiple years, HTN. Upon assessment, pt appears pale in color, becomes dyspneic upon sitting up in bed. Bilat lung sounds clear to auscultation, pt w/ difficulty taking deep breath d/t dyspnea. Pt A/Ox3, speaking in clear/complete sentences. Pt ambulates independently w/ steady gait. Pt placed in gown, on continuous cardiac monitor and pulse ox. Pt normotensive. IV placed, labs drawn. EKG completed. Family @ bedside.

## 2024-02-28 NOTE — ED PROVIDER NOTE - CLINICAL SUMMARY MEDICAL DECISION MAKING FREE TEXT BOX
tegretol is a potent CY inducer, making DOACs potentially ineffective given large clot burden  will dose 1mg/kg lovenox now  admit for echo, BP monitoring tegretol is a potent CY inducer, making DOACs potentially ineffective given pts large clot burden  likely was orthostatic in office, had taken home dose BP meds,   orthostatics repeated here, and negative  will dose 1mg/kg lovenox now  admit for echo, BP monitoring, eval for non DOAC treatment of his PE/DVT

## 2024-02-28 NOTE — ED PROVIDER NOTE - OBJECTIVE STATEMENT
53M PMHx childhood epilepsy (on tegretol and keppra), HTN, recent diagnosis of DVT/PE started on eliquis,  presents with presyncopal episode from PCP office. Patient was recently admitted 1 week ago found to have provoked DVT/ PE- CT scan showing no R heart strain and was discharged on eliquis. Patient was complaint with medication and went to follow up with PCP regarding shoulder pain and at the end of his visit when he tried to stand he became dizzy - and hypotensive. He was brought into the ED where he felt better already and had no dizziness, shoulder pain or other symptoms. No loss of consciousness, or seizures recently.

## 2024-02-28 NOTE — ED ADULT TRIAGE NOTE - CCCP TRG CHIEF CMPLNT
BMI above normal limits, recommended weight loss, improve diet and follow up with internist. syncope

## 2024-02-29 ENCOUNTER — TRANSCRIPTION ENCOUNTER (OUTPATIENT)
Age: 54
End: 2024-02-29

## 2024-02-29 ENCOUNTER — RESULT REVIEW (OUTPATIENT)
Age: 54
End: 2024-02-29

## 2024-02-29 PROCEDURE — 93306 TTE W/DOPPLER COMPLETE: CPT | Mod: 26

## 2024-02-29 PROCEDURE — 73030 X-RAY EXAM OF SHOULDER: CPT | Mod: 26,LT

## 2024-02-29 PROCEDURE — 99233 SBSQ HOSP IP/OBS HIGH 50: CPT | Mod: GC

## 2024-02-29 RX ORDER — LISINOPRIL 2.5 MG/1
1 TABLET ORAL
Qty: 0 | Refills: 0 | DISCHARGE

## 2024-02-29 RX ORDER — AMLODIPINE BESYLATE 2.5 MG/1
1 TABLET ORAL
Qty: 0 | Refills: 0 | DISCHARGE

## 2024-02-29 RX ORDER — ENOXAPARIN SODIUM 100 MG/ML
100 INJECTION SUBCUTANEOUS
Qty: 100 | Refills: 2
Start: 2024-02-29 | End: 2024-05-28

## 2024-02-29 RX ADMIN — LEVETIRACETAM 500 MILLIGRAM(S): 250 TABLET, FILM COATED ORAL at 18:56

## 2024-02-29 RX ADMIN — LEVETIRACETAM 1000 MILLIGRAM(S): 250 TABLET, FILM COATED ORAL at 18:57

## 2024-02-29 RX ADMIN — LEVETIRACETAM 1000 MILLIGRAM(S): 250 TABLET, FILM COATED ORAL at 06:54

## 2024-02-29 RX ADMIN — Medication 400 MILLIGRAM(S): at 18:57

## 2024-02-29 RX ADMIN — ENOXAPARIN SODIUM 100 MILLIGRAM(S): 100 INJECTION SUBCUTANEOUS at 18:00

## 2024-02-29 RX ADMIN — ENOXAPARIN SODIUM 100 MILLIGRAM(S): 100 INJECTION SUBCUTANEOUS at 06:54

## 2024-02-29 RX ADMIN — Medication 400 MILLIGRAM(S): at 06:53

## 2024-02-29 NOTE — PROGRESS NOTE ADULT - PROBLEM SELECTOR PLAN 3
Patient with a hx of HTN on home amlodipine & lisinopril. Per HIE, patient instructed to d/c amlodipine.   - hold home meds i/s/o hypotensive presyncopal episode

## 2024-02-29 NOTE — DISCHARGE NOTE PROVIDER - CARE PROVIDER_API CALL
Tiffany Hyman  [address] [address]  Phone: [phone]  Fax: [phone]  Established Patient  Follow Up Time:

## 2024-02-29 NOTE — DISCHARGE NOTE PROVIDER - ATTENDING DISCHARGE PHYSICAL EXAMINATION:
52yo M Hx epilepsy, HTN recent PE with LLE DVT likely provoked due to immobility due to knee pain and cold who presented with presyncopal episode from PCP office.  Orthostatic hypotension after taking home meds (was told to stop amlodipine by cards but did not), Echo wnl mild LVH no R heart strain.  Eliquis changed to Lovenox due to CYP3A induction by Tegretol. Stable for DC home today.

## 2024-02-29 NOTE — PHYSICAL THERAPY INITIAL EVALUATION ADULT - ADDITIONAL COMMENTS
Pt. resides in an elevator building, no steps to enter. Pt. ambulates w/o assistive device; no adaptive equipment, no services.

## 2024-02-29 NOTE — PROGRESS NOTE ADULT - PROBLEM SELECTOR PLAN 2
Hx of PE diagnosed 1 week ago, BNP Trops negative and no RHS on CT scan at that time, discharged on Eliquis. Patient was compliant with eliquis, likely interaction of CY induction from cabamazepine and eliquis. Switched to Lovenox on presentation.   - f/u TTE  - c/w Lovenox 100mg subQ BID

## 2024-02-29 NOTE — DISCHARGE NOTE PROVIDER - NSDCMRMEDTOKEN_GEN_ALL_CORE_FT
carBAMazepine 200 mg oral capsule, extended release: 2 cap(s) orally 2 times a day Take at 6 am and 6 pm  Eliquis Starter Pack for Treatment of DVT and PE 5 mg oral tablet: 2 tab(s) orally every 12 hours Eliquis starter pack. Pt received one dose here at 9AM. Please take one dose in the evening.  guaiFENesin 600 mg oral tablet, extended release: 1 tab(s) orally every 12 hours Take for 3 days  Keppra 1000 mg oral tablet: 1000 milligram(s) orally in the morning (8AM)  1500 milligrams(s) orally in the evening (8PM)   lisinopril 40 mg oral tablet: 1 tab(s) orally once a day   carBAMazepine 200 mg oral capsule, extended release: 2 cap(s) orally 2 times a day Take at 6 am and 6 pm  Keppra 1000 mg oral tablet: 1000 milligram(s) orally in the morning (8AM)  1500 milligrams(s) orally in the evening (8PM)   Lovenox 100 mg/mL injectable solution: 100 milligram(s) subcutaneously every 12 hours

## 2024-02-29 NOTE — PROGRESS NOTE ADULT - PROBLEM SELECTOR PLAN 4
Patient with hx of epilepsy. Last seizure was after recent admission to [facility] for likely URI, at the time AED were uptitrated.  - C/w home Carbamazepine 400mg q12h  - C/w Keppra 1000mg qD at 8am,1500 qD at 8pm Statement Selected

## 2024-02-29 NOTE — PHYSICAL THERAPY INITIAL EVALUATION ADULT - PERTINENT HX OF CURRENT PROBLEM, REHAB EVAL
Pt. is a 53 y.o R hand dominant male with h/o PE Dx ~ 1 week PTA, currently presenting from PCP office s/p near-syncopal episode. Pt's initial c/o at PCP was L shoulder pain that pt. thought could have been related to his recent PE.

## 2024-02-29 NOTE — PROGRESS NOTE ADULT - PROBLEM SELECTOR PLAN 1
Patient p/w episode of presyncope at PCP office. Recently discharged on Eliquis for PE (see below problem). Possible increased excretion of Eliquis due to CY induction from cabamazepine. Pt also noted to be hypotensive during episode, and had taken BP meds in AM prior to episode. Orthostatics negative in ED. EKG NSR, trops and BNP negative. No seizure-like activity during episode. Presyncope possibly 2/2 HTN medications vs. RHS 2/2 inadequate PE treatment, although low suspicion for RHS.   - continue to hold BP meds at this time  - Repeat orthostatics negative  - f/u TTE

## 2024-02-29 NOTE — PHYSICAL THERAPY INITIAL EVALUATION ADULT - DIAGNOSIS, PT EVAL
5A: Primary Prevention/Risk Reduction for Loss of Balance and Falling. 4E: Impaired Joint Mobility, Motor Function, Muscle Performance, and Range of Motion Associated with Localized Inflammation

## 2024-02-29 NOTE — PROGRESS NOTE ADULT - ATTENDING COMMENTS
Office Consent to Operation/Invasive Procedure    Name: Rosalie Ospina   YOB: 1992   MRN: [id number]   AUTHORIZATION:  I authorize performance on the patient of the following surgical operation/invasive procedure under the direction, supervision and authority of Amy Sutton MD.  Description of operation(s): IUD REMOVAL     NATURE OF TREATMENT:  The nature of my condition, the nature and purpose of the operation/procedure, possible alternative methods of treatment, risks involved, and the possibility of complications have been explained to me. I have had an opportunity to discuss this operation/procedure with the physician and other doctors concerned, and I have received answers to all questions I asked and do hereby assume all risks involved. No guarantee or assurance has been given to me by anyone as to the results that may be obtained.     ADDITIONAL PROCEDURES:  I consent to the performance of operations and procedures in addition to or different from those now contemplated, whether or not arising from presently unforeseen conditions, which the above-named doctor or his/her associate or assistants may consider necessary or advisable in the course of the operation.    OTHER QUALIFIED PRACTITIONERS:  I have been advised, and I agree, that the operation/procedure may be performed by a team of doctors including one or more attending, doctors, residents and medical students. I consent to these other qualified medical practitioners, who are not physicians, performing important parts of the operation/procedure or the administration of anesthetic agents.    ANESTHETIC AGENTS: I understand that anesthetic agents may have serious and even fatal complications. I consent to the administration of such anesthetics/sedatives/medications as may be considered necessary or advisable by the physician responsible for the service.      OBSERVATION:  For the purpose of advancing the educational programs of the facility I  consent to the admittance of qualified observers.    PHOTOGRAPHY: I consent to the taking and publication of photographs and other reproductions in the course of the operation or procedure for the purpose of advancing education provided that the identify of the patient is not revealed.    TISSUE USE:  I authorize the facility to preserve and use for scientific or teaching purposes or otherwise dispose of any dismembered tissue resulting from the procedure and treatment authorized above.    INDEPENDENT PHYSICIAN SERVICES: I have been informed and I acknowledge and fully understand that the Physician(s) providing services to me in this facility, such as my personal Physician(s),  Specialty Physician(s), Radiology, Pathology, consulting, and other allied health physicians, are independent contractors and are not employees or agents of this facility, unless otherwise specifically stated. My decision to seek medical care at the facility is not based upon any understanding, representation, advertisement, media campaign, inference, implication or reliance that the physicians who are or will be treating me are employees or agents of the facility.     DO NOT SIGN IF YOU HAVE ANY QUESTIONS  ________________________________________________________________________________________________________________  My signature below constitutes my acknowledgement and agreement that I have read and understand the above, was given an opportunity to discuss this form and ask questions, that all questions were answered to my satisfaction, and I am satisfied I understand the form's contents and significance.    Date: __________________ Time: _________________ Signed: ______________________________________________________        Patient/Legally Authorized Representative (Yocha Dehe appropriate)     Date: __________________   Time: _________________   Signed: ______________________________________________________  Witness   Certificate of  Interpretation:  I certify that I have read the foregoing to the signor hearof in the ____________________________________________________ language.     Date: ________________ Time: __________________ Signed: _________________________________________________________     Affirmation by Responsible Physician  I have informed the above named patient or Legally Authorized Representative of the medical condition requiring surgical treatment and/or the further diagnostic procedures referred to above. I have explained, consistent with accepted medical judgment, the nature and purposes of the treatment or procedures, the reasonable (1) possible alternatives (2) risks/complications and (3) consequences in the treatment or procedure consented to. I have also given the opportunity to ask questions and have answered any such questions.     Date:________________ Time: __________________ Signed: _________________________________________________________      52yo M Hx epilepsy, HTN recent PE with LLE DVT likely provoked due to immobility due to knee pain and cold who presented with presyncopal episode from PCP office    Patient was at his PCP's office for L shoulder pain with movement that he was concerned was sequela of PE.  Was planning for XR and when went to stand up became diaphoretic and hypotensive.  Sent to ED where orthostatics were negative    #Orthostatic hypotension  - likely in the setting of BP medications and poor PO intake.  Reports had some toast prior to appointment, took both amlodipine 2.5mg and Lisinopril 40mg in the AM.  Was recently seen by Dr Gerald Reyes who had recommended stopping the amlodipine 2.5mg due to relative hypotension  - Echo with normal LV function and RV function, no R heart strain  - hold BP meds    #DVT/PE  - CYP3A interaction of Tegretol and Eliquis decreased concentraion of Eliquis  - Lovenox does not have this interaction and preferable to warfarin  - Echo as above w/o R heart strain, trops wnl.  Unlikely that PE contributed to in office presyncope    #Epilepsy  - difficult to control  - c/w home Keppra and Tegretol    #L shoulder pain  - at site of prior shoulder dislocation  - outpatient ortho follow up    Medically ready for discharge off antihypertensives mortgage

## 2024-02-29 NOTE — DISCHARGE NOTE PROVIDER - HOSPITAL COURSE
#Discharge: do not delete    53M PMHx epilepsy, HTN presnts with presyncopal episode from PCP office. Patient was recently admitted 1 week ago found to have provoked DVT/ PE- CT scan showing no R heart strain and was discharged on eliquis. Patient was complaint with medication and went to follow up with PCP regarding shoulder pain and at the end of his visit when he tried to stand he became dizzy - and hypotensive. He was brought into the ED where he felt better already and had no dizziness, shoulder pain or other symptoms. No loss of consciousness, or seizures recently. Pt admitted for further w/u o f RH strain and plan to change AC.      #Near syncope.   EKG NSR, trops and BNP negative.   TTE:   - discontinue amlodipine     #Pulmonary embolism.   ·  Plan: patient with a hx of PE diagnosed 1 week ago  was discharged on eliquis, at the time BNP Trops negative and no RHS on CT scan  patient was compliant with eliquis, likely was interaction of CY induction from cabamazepine causing eliquis to not be sufficient tx  switched to lovenox in ED today. could have been cause of presyncopal episode  TTEL   -c/w lovenox full dose - 100 BID  - f/u PCP     # Hypertension.   ·  Plan: patient with a hx of HTN on home amlodipine & lisinopril  - discontinue amlodipine  - continue lisinopril at lower dose of ____________    #Epilepsy.   ·  Plan: patient with hx of epilepsy  last seizure was after recent admission to [facility] for likely URI, at the time AED were uptitrated  - C/w home Carbamazepine 400 Q12  - C/w Keppra 1000 Qd at 8am,1500 Qd at 8pm.    Patient was discharged to: (home/[facility]/acute rehab/hospice, etc, and with what services – home health PT/RN? Home O2?)  New medications:  Changes to old medications:  Medications that were stopped: amlodipine   Items to follow up as outpatient:  Physical exam at the time of discharge:  Physical Exam: PHYSICAL EXAM:    GENERAL: Comfortable, no acute distress   HEAD:  Normocephalic, atraumatic  EYES: EOMI, PERRLA  HEENT: Moist mucous membranes  NECK: Supple, No JVD  NERVOUS SYSTEM:  Alert & Oriented X3, Motor Strength 5/5 B/L upper and lower extremities  CHEST/LUNG: Clear to auscultation bilaterally  HEART: Regular rate and rhythm  ABDOMEN: Soft, non tender, Nondistended, Bowel sounds present  GENITOURINARY: Voiding, no palpable bladder  EXTREMITIES:   No clubbing, cyanosis, or edema  MUSCULOSKELETAL- No muscle tenderness, no joint tenderness  SKIN-no rash   #Discharge: do not delete    53M PMHx epilepsy, HTN presnts with presyncopal episode from PCP office. Patient was recently admitted 1 week ago found to have provoked DVT/ PE- CT scan showing no R heart strain and was discharged on eliquis. Patient was complaint with medication and went to follow up with PCP regarding shoulder pain and at the end of his visit when he tried to stand he became dizzy - and hypotensive. He was brought into the ED where he felt better already and had no dizziness, shoulder pain or other symptoms. No loss of consciousness, or seizures recently. Pt admitted for further w/u o f RH strain and plan to change AC.      #Near syncope.   EKG NSR, trops and BNP negative.   TTE:   - discontinue amlodipine     #Pulmonary embolism.   ·  Plan: patient with a hx of PE diagnosed 1 week ago  was discharged on eliquis, at the time BNP Trops negative and no RHS on CT scan  patient was compliant with eliquis, likely was interaction of CY induction from cabamazepine causing eliquis to not be sufficient tx  switched to lovenox in ED today. could have been cause of presyncopal episode  TTEL   -c/w lovenox full dose - 100 BID  - f/u PCP     # Hypertension.   ·  Plan: patient with a hx of HTN on home amlodipine & lisinopril  - discontinue amlodipine  - discontinue lisinopril     #Epilepsy.   ·  Plan: patient with hx of epilepsy  last seizure was after recent admission to [facility] for likely URI, at the time AED were uptitrated  - C/w home Carbamazepine 400 Q12  - C/w Keppra 1000 Qd at 8am,1500 Qd at 8pm.    Patient was discharged to: (home/[facility]/acute rehab/hospice, etc, and with what services – home health PT/RN? Home O2?)  New medications:  Changes to old medications:  Medications that were stopped: amlodipine   Items to follow up as outpatient:  Physical exam at the time of discharge:  Physical Exam: PHYSICAL EXAM:    GENERAL: Comfortable, no acute distress   HEAD:  Normocephalic, atraumatic  EYES: EOMI, PERRLA  HEENT: Moist mucous membranes  NECK: Supple, No JVD  NERVOUS SYSTEM:  Alert & Oriented X3, Motor Strength 5/5 B/L upper and lower extremities  CHEST/LUNG: Clear to auscultation bilaterally  HEART: Regular rate and rhythm  ABDOMEN: Soft, non tender, Nondistended, Bowel sounds present  GENITOURINARY: Voiding, no palpable bladder  EXTREMITIES:   No clubbing, cyanosis, or edema  MUSCULOSKELETAL- No muscle tenderness, no joint tenderness  SKIN-no rash   #Discharge: do not delete    53M PMHx epilepsy, HTN presnts with presyncopal episode from PCP office. Patient was recently admitted 1 week ago found to have provoked DVT/ PE- CT scan showing no R heart strain and was discharged on eliquis. Patient was complaint with medication and went to follow up with PCP regarding shoulder pain and at the end of his visit when he tried to stand he became dizzy - and hypotensive. He was brought into the ED where he felt better already and had no dizziness, shoulder pain or other symptoms. No loss of consciousness, or seizures recently. Pt admitted for further w/u o f RH strain and plan to change AC.      #Near syncope.   EKG NSR, trops and BNP negative.   TTE:   - discontinue amlodipine     #Pulmonary embolism.   ·  Plan: patient with a hx of PE diagnosed 1 week ago  was discharged on eliquis, at the time BNP Trops negative and no RHS on CT scan  patient was compliant with eliquis, likely was interaction of CY induction from cabamazepine causing eliquis to not be sufficient tx  switched to lovenox in ED today. could have been cause of presyncopal episode  TTEL   -c/w lovenox full dose - 100 BID  - f/u PCP     # Hypertension.   ·  Plan: patient with a hx of HTN on home amlodipine & lisinopril  - discontinue amlodipine  - discontinue lisinopril     #Epilepsy.   ·  Plan: patient with hx of epilepsy  last seizure was after recent admission to [facility] for likely URI, at the time AED were uptitrated  - C/w home Carbamazepine 400 Q12  - C/w Keppra 1000 Qd at 8am,1500 Qd at 8pm.    Patient was discharged to: (home/[facility]/acute rehab/hospice, etc, and with what services – home health PT/RN? Home O2?)  New medications: lovenox 100mg BID   Medications that were stopped: amlodipine, lisinopril, eliquis  Items to follow up as outpatient: please follow up with PCP   Physical exam at the time of discharge:    GENERAL: Comfortable, no acute distress   HEAD:  Normocephalic, atraumatic  EYES: EOMI, PERRLA  HEENT: Moist mucous membranes  NECK: Supple, No JVD  NERVOUS SYSTEM:  Alert & Oriented X3, Motor Strength 5/5 B/L upper and lower extremities  CHEST/LUNG: Clear to auscultation bilaterally  HEART: Regular rate and rhythm  ABDOMEN: Soft, non tender, Nondistended, Bowel sounds present  GENITOURINARY: Voiding, no palpable bladder  EXTREMITIES:   No clubbing, cyanosis, or edema  MUSCULOSKELETAL- No muscle tenderness, no joint tenderness  SKIN-no rash   #Discharge: do not delete    53M PMHx epilepsy, HTN presnts with presyncopal episode from PCP office. Patient was recently admitted 1 week ago found to have provoked DVT/ PE- CT scan showing no R heart strain and was discharged on eliquis. Patient was complaint with medication and went to follow up with PCP regarding shoulder pain and at the end of his visit when he tried to stand he became dizzy - and hypotensive. He was brought into the ED where he felt better already and had no dizziness, shoulder pain or other symptoms. No loss of consciousness, or seizures recently. Pt admitted for further w/u o f RH strain and plan to change AC.      #Near syncope.   EKG NSR, trops and BNP negative.   TTE:   - discontinue amlodipine and lisinopril     #Pulmonary embolism.   patient with a hx of PE diagnosed 1 week ago  was discharged on eliquis, at the time BNP Trops negative and no RHS on CT scan  patient was compliant with eliquis, likely was interaction of CY induction from cabamazepine causing eliquis to not be sufficient tx  switched to lovenox in ED today. could have been cause of presyncopal episode  TTEL   -c/w lovenox full dose - 100 BID  - f/u PCP     # Hypertension.    patient with a hx of HTN on home amlodipine & lisinopril  - discontinue amlodipine  - discontinue lisinopril     #Epilepsy.   patient with hx of epilepsy  last seizure was after recent admission to [facility] for likely URI, at the time AED were uptitrated  - C/w home Carbamazepine 400 Q12  - C/w Keppra 1000 Qd at 8am,1500 Qd at 8pm.    Patient was discharged to: (home/[facility]/acute rehab/hospice, etc, and with what services – home health PT/RN? Home O2?)  New medications: lovenox 100mg BID   Medications that were stopped: amlodipine, lisinopril, eliquis  Items to follow up as outpatient: please follow up with PCP   Physical exam at the time of discharge:    GENERAL: Comfortable, no acute distress   HEAD:  Normocephalic, atraumatic  EYES: EOMI, PERRLA  HEENT: Moist mucous membranes  NECK: Supple, No JVD  NERVOUS SYSTEM:  Alert & Oriented X3, Motor Strength 5/5 B/L upper and lower extremities  CHEST/LUNG: Clear to auscultation bilaterally  HEART: Regular rate and rhythm  ABDOMEN: Soft, non tender, Nondistended, Bowel sounds present  GENITOURINARY: Voiding, no palpable bladder  EXTREMITIES:   No clubbing, cyanosis, or edema  MUSCULOSKELETAL- No muscle tenderness, no joint tenderness  SKIN-no rash

## 2024-02-29 NOTE — PHYSICAL THERAPY INITIAL EVALUATION ADULT - PREDICTED DURATION OF THERAPY (DAYS/WKS), PT EVAL
Pt. remains functionally independent, does not require further in-patient PT f/u at this time. OP PT f/u may be recommended to address L shoulder pain.

## 2024-02-29 NOTE — DISCHARGE NOTE PROVIDER - NSDCCPCAREPLAN_GEN_ALL_CORE_FT
PRINCIPAL DISCHARGE DIAGNOSIS  Diagnosis: Near syncope  Assessment and Plan of Treatment:       SECONDARY DISCHARGE DIAGNOSES  Diagnosis: Hypertension  Assessment and Plan of Treatment: You have a history of hypertension and were previously prescribed amlodipine and lisinopril.  Please STOP taking amlodipine.  Please continue to take lisinopril _________________    Diagnosis: Orthostatic hypotension  Assessment and Plan of Treatment:     Diagnosis: Near syncope  Assessment and Plan of Treatment:      PRINCIPAL DISCHARGE DIAGNOSIS  Diagnosis: Near syncope  Assessment and Plan of Treatment: You presented with a near syncopal episode. Given your recent PE, and echocardiogram was performed to ensure that there was not any residual heart strain. The echocardiogram did not demonstrate any heart strain. Your near syncopal episode was likley in the setting of taking your high blood pressure medications while having poor oral intake.   Please stop taking amlodipine  Please take lisinopril  Please follow up with your PCP .   Please take lisinopril _____      SECONDARY DISCHARGE DIAGNOSES  Diagnosis: Orthostatic hypotension  Assessment and Plan of Treatment:     Diagnosis: Near syncope  Assessment and Plan of Treatment:     Diagnosis: Hypertension  Assessment and Plan of Treatment: You have a history of hypertension and were previously prescribed amlodipine and lisinopril.  Please STOP taking amlodipine.  Please continue to take lisinopril _________________     PRINCIPAL DISCHARGE DIAGNOSIS  Diagnosis: Near syncope  Assessment and Plan of Treatment: You presented with a near syncopal episode. Given your recent PE, and echocardiogram was performed to ensure that there was not any residual heart strain. The echocardiogram did not demonstrate any heart strain. Your near syncopal episode was likley in the setting of taking your high blood pressure medications while having poor oral intake.   Please stop taking amlodipine  Please stop taking lisinopril  Please follow up with your PCP .   Please take lisinopril _____      SECONDARY DISCHARGE DIAGNOSES  Diagnosis: Hypertension  Assessment and Plan of Treatment: You have a history of hypertension and were previously prescribed amlodipine and lisinopril. You have been feeling dizzy and your blood pressures have been within normal range while in the hospital. These 2 blood pressure medications were discontinued while in the hospital.   Please STOP taking amlodipine.  Please STOP taking lisinopril    Diagnosis: Pulmonary embolism  Assessment and Plan of Treatment: You were found to have a pulmonary embolism, which is a blood clot that has traveled to your lungs. This can cause a fast heart rate, breathing difficulty, and strain on the heart. Your heart function was monitored and you were placed on an anticoagulant medication during the hospitalization. Please continue to take your anticoagulant as prescribed and follow up with your primary care provider  Please injection 100mg Lovenox every 12 hours   Pleaes follow up with your PCP       Diagnosis: Orthostatic hypotension  Assessment and Plan of Treatment:     Diagnosis: Near syncope  Assessment and Plan of Treatment:      PRINCIPAL DISCHARGE DIAGNOSIS  Diagnosis: Near syncope  Assessment and Plan of Treatment: You presented with a near syncopal episode. Given your recent PE, and echocardiogram was performed to ensure that there was not any residual heart strain. The echocardiogram did not demonstrate any heart strain. Your near syncopal episode was likley in the setting of taking your high blood pressure medications while having poor oral intake.   Please stop taking amlodipine  Please stop taking lisinopril     Please follow up with your PCP .   Please take lisinopril _____      SECONDARY DISCHARGE DIAGNOSES  Diagnosis: Hypertension  Assessment and Plan of Treatment: You have a history of hypertension and were previously prescribed amlodipine and lisinopril. You have been feeling dizzy and your blood pressures have been within normal range while in the hospital. These 2 blood pressure medications were discontinued while in the hospital.   Please STOP taking amlodipine.  Please STOP taking lisinopril    Diagnosis: Pulmonary embolism  Assessment and Plan of Treatment: You were found to have a pulmonary embolism, which is a blood clot that has traveled to your lungs. This can cause a fast heart rate, breathing difficulty, and strain on the heart. Your heart function was monitored and you were placed on an anticoagulant medication during the hospitalization. Please continue to take your anticoagulant as prescribed and follow up with your primary care provider  Please injection 100mg Lovenox every 12 hours   Pleaes follow up with your PCP       Diagnosis: Orthostatic hypotension  Assessment and Plan of Treatment:     Diagnosis: Near syncope  Assessment and Plan of Treatment:      PRINCIPAL DISCHARGE DIAGNOSIS  Diagnosis: Near syncope  Assessment and Plan of Treatment: You presented with a near syncopal episode. Given your recent PE, an echocardiogram was performed to ensure that there was not any residual heart strain. The echocardiogram did not demonstrate any heart strain. Your near syncopal episode was likley in the setting of taking your high blood pressure medications while having poor oral intake.   Please stop taking amlodipine  Please stop taking lisinopril     Please follow up with your PCP .      SECONDARY DISCHARGE DIAGNOSES  Diagnosis: Hypertension  Assessment and Plan of Treatment: You have a history of hypertension and were previously prescribed amlodipine and lisinopril. You have been feeling dizzy and your blood pressures have been within normal range while in the hospital. These 2 blood pressure medications were discontinued while in the hospital.   Please STOP taking amlodipine.  Please STOP taking lisinopril    Diagnosis: Pulmonary embolism  Assessment and Plan of Treatment: You were found to have a pulmonary embolism, which is a blood clot that has traveled to your lungs. This can cause a fast heart rate, breathing difficulty, and strain on the heart. Your heart function was monitored and you were placed on an anticoagulant medication during the hospitalization. Please continue to take your anticoagulant as prescribed and follow up with your primary care provider  Please discontinue taking eliquis.   Please inject 100mg Lovenox every 12 hours   Please follow up with your PCP.       Diagnosis: Orthostatic hypotension  Assessment and Plan of Treatment:     Diagnosis: Near syncope  Assessment and Plan of Treatment:

## 2024-02-29 NOTE — PATIENT PROFILE ADULT - FALL HARM RISK - UNIVERSAL INTERVENTIONS
Bed in lowest position, wheels locked, appropriate side rails in place/Call bell, personal items and telephone in reach/Instruct patient to call for assistance before getting out of bed or chair/Non-slip footwear when patient is out of bed/[place] to call system/Physically safe environment - no spills, clutter or unnecessary equipment/Purposeful Proactive Rounding/Room/bathroom lighting operational, light cord in reach

## 2024-02-29 NOTE — PROGRESS NOTE ADULT - PROBLEM SELECTOR PLAN 5
F: None  E: Replete PRN  N: regular diet  DVT: Lovenox, full dose  Code: Full.  Dispo: [facility]

## 2024-02-29 NOTE — PROGRESS NOTE ADULT - SUBJECTIVE AND OBJECTIVE BOX
O/N Events: No acute events.     Subjective/ROS: Patient seen and examined at bedside. Endorsing 4/10 left shoulder pain and LLE soreness. States he feels otherwise well. Denies fever/chills, HA, CP, SOB, n/v.    VITALS  Vital Signs Last 24 Hrs  T(C): 36.9 (29 Feb 2024 09:03), Max: 37.1 (29 Feb 2024 06:35)  T(F): 98.4 (29 Feb 2024 09:03), Max: 98.8 (29 Feb 2024 06:35)  HR: 86 (29 Feb 2024 09:03) (69 - 89)  BP: 148/78 (29 Feb 2024 09:03) (106/58 - 148/88)  BP(mean): --  RR: 16 (29 Feb 2024 09:03) (16 - 18)  SpO2: 96% (29 Feb 2024 09:03) (94% - 97%)    Parameters below as of 29 Feb 2024 09:03  Patient On (Oxygen Delivery Method): room air      CAPILLARY BLOOD GLUCOSE      POCT Blood Glucose.: 122 mg/dL (28 Feb 2024 16:34)      PHYSICAL EXAM  General: NAD  Head: NC/AT; MMM; EOMI  Neck: Supple; no JVD  Respiratory: CTABL; no wheezes/rales/rhonchi  Cardiovascular: RRR, +S1/S2, no murmurs, rubs, gallops   Gastrointestinal: Soft; NT; obese abdomen; +BS  Extremities: WWP; trace pitting edema of LLE, nontender to palpation. No edema of RLE or UE. 2+ peripheral pulses  Neurological: A&Ox3, no obvious focal deficits; sensation intact throughout    MEDICATIONS  (STANDING):  carBAMazepine ER Capsule 400 milliGRAM(s) Oral every 12 hours  enoxaparin Injectable 100 milliGRAM(s) SubCutaneous every 12 hours  guaiFENesin  milliGRAM(s) Oral every 12 hours  levETIRAcetam 500 milliGRAM(s) Oral every 24 hours  levETIRAcetam 1000 milliGRAM(s) Oral every 12 hours    MEDICATIONS  (PRN):      No Known Allergies      LABS                        13.2   8.38  )-----------( 317      ( 28 Feb 2024 18:03 )             41.4     02-28    141  |  102  |  18  ----------------------------<  114<H>  4.3   |  25  |  0.89    Ca    9.2      28 Feb 2024 18:03    TPro  7.7  /  Alb  4.0  /  TBili  0.2  /  DBili  x   /  AST  30  /  ALT  55<H>  /  AlkPhos  74  02-28    PT/INR - ( 28 Feb 2024 18:03 )   PT: 13.9 sec;   INR: 1.23          PTT - ( 28 Feb 2024 18:03 )  PTT:30.6 sec  Urinalysis Basic - ( 28 Feb 2024 18:03 )    Color: x / Appearance: x / SG: x / pH: x  Gluc: 114 mg/dL / Ketone: x  / Bili: x / Urobili: x   Blood: x / Protein: x / Nitrite: x   Leuk Esterase: x / RBC: x / WBC x   Sq Epi: x / Non Sq Epi: x / Bacteria: x     O/N Events: No acute events.     Subjective/ROS: Patient seen and examined at bedside. Endorsing 4/10 left shoulder pain and LLE soreness. States he feels otherwise well. Denies fever/chills, HA, CP, SOB, n/v.    VITALS  Vital Signs Last 24 Hrs  T(C): 36.9 (29 Feb 2024 09:03), Max: 37.1 (29 Feb 2024 06:35)  T(F): 98.4 (29 Feb 2024 09:03), Max: 98.8 (29 Feb 2024 06:35)  HR: 86 (29 Feb 2024 09:03) (69 - 89)  BP: 148/78 (29 Feb 2024 09:03) (106/58 - 148/88)  BP(mean): --  RR: 16 (29 Feb 2024 09:03) (16 - 18)  SpO2: 96% (29 Feb 2024 09:03) (94% - 97%)    Parameters below as of 29 Feb 2024 09:03  Patient On (Oxygen Delivery Method): room air      CAPILLARY BLOOD GLUCOSE      POCT Blood Glucose.: 122 mg/dL (28 Feb 2024 16:34)      PHYSICAL EXAM  General: NAD  Head: NC/AT; MMM; EOMI  Neck: Supple; no JVD  Respiratory: CTABL; no wheezes/rales/rhonchi  Cardiovascular: RRR, +S1/S2, no murmurs, rubs, gallops   Gastrointestinal: Soft; NT; obese abdomen; +BS  Extremities: WWP; trace pitting edema of LLE, nontender to palpation. No edema of RLE or UE. 2+ peripheral pulses  Neurological: A&Ox3, no obvious focal deficits; sensation intact throughout    MEDICATIONS  (STANDING):  carBAMazepine ER Capsule 400 milliGRAM(s) Oral every 12 hours  enoxaparin Injectable 100 milliGRAM(s) SubCutaneous every 12 hours  guaiFENesin  milliGRAM(s) Oral every 12 hours  levETIRAcetam 500 milliGRAM(s) Oral every 24 hours  levETIRAcetam 1000 milliGRAM(s) Oral every 12 hours    MEDICATIONS  (PRN):      No Known Allergies      LABS                        13.2   8.38  )-----------( 317      ( 28 Feb 2024 18:03 )             41.4     02-28    141  |  102  |  18  ----------------------------<  114<H>  4.3   |  25  |  0.89    Ca    9.2      28 Feb 2024 18:03    TPro  7.7  /  Alb  4.0  /  TBili  0.2  /  DBili  x   /  AST  30  /  ALT  55<H>  /  AlkPhos  74  02-28    PT/INR - ( 28 Feb 2024 18:03 )   PT: 13.9 sec;   INR: 1.23          PTT - ( 28 Feb 2024 18:03 )  PTT:30.6 sec  Urinalysis Basic - ( 28 Feb 2024 18:03 )    Color: x / Appearance: x / SG: x / pH: x  Gluc: 114 mg/dL / Ketone: x  / Bili: x / Urobili: x   Blood: x / Protein: x / Nitrite: x   Leuk Esterase: x / RBC: x / WBC x   Sq Epi: x / Non Sq Epi: x / Bacteria: x    IMAGING:  < from: Xray Chest 2 Views PA/Lat (02.28.24 @ 19:32) >  IMPRESSION:    Lungs clear. No infiltrate pleural effusion or pneumothorax. Unremarkable   cardiac silhouette. No acute bone abnormality.    < end of copied text >       O/N Events: No acute events.     Subjective/ROS: Patient seen and examined at bedside. Endorsing 4/10 left shoulder pain and LLE soreness. States he feels otherwise well. Denies fever/chills, HA, CP, SOB, n/v.    VITALS  Vital Signs Last 24 Hrs  T(C): 36.9 (29 Feb 2024 09:03), Max: 37.1 (29 Feb 2024 06:35)  T(F): 98.4 (29 Feb 2024 09:03), Max: 98.8 (29 Feb 2024 06:35)  HR: 86 (29 Feb 2024 09:03) (69 - 89)  BP: 148/78 (29 Feb 2024 09:03) (106/58 - 148/88)  BP(mean): --  RR: 16 (29 Feb 2024 09:03) (16 - 18)  SpO2: 96% (29 Feb 2024 09:03) (94% - 97%)    Parameters below as of 29 Feb 2024 09:03  Patient On (Oxygen Delivery Method): room air      CAPILLARY BLOOD GLUCOSE      POCT Blood Glucose.: 122 mg/dL (28 Feb 2024 16:34)      PHYSICAL EXAM  General: NAD  Head: NC/AT; MMM; EOMI  Neck: Supple; no JVD  Respiratory: CTABL; no wheezes/rales/rhonchi  Cardiovascular: RRR, +S1/S2, no murmurs, rubs, gallops   Gastrointestinal: Soft; NT; central adiposity; +BS  Extremities: WWP; trace pitting edema of LLE, nontender to palpation. No edema of RLE or UE. 2+ peripheral pulses  Neurological: A&Ox3, no obvious focal deficits; sensation intact throughout    MEDICATIONS  (STANDING):  carBAMazepine ER Capsule 400 milliGRAM(s) Oral every 12 hours  enoxaparin Injectable 100 milliGRAM(s) SubCutaneous every 12 hours  guaiFENesin  milliGRAM(s) Oral every 12 hours  levETIRAcetam 500 milliGRAM(s) Oral every 24 hours  levETIRAcetam 1000 milliGRAM(s) Oral every 12 hours    MEDICATIONS  (PRN):      No Known Allergies      LABS                        13.2   8.38  )-----------( 317      ( 28 Feb 2024 18:03 )             41.4     02-28    141  |  102  |  18  ----------------------------<  114<H>  4.3   |  25  |  0.89    Ca    9.2      28 Feb 2024 18:03    TPro  7.7  /  Alb  4.0  /  TBili  0.2  /  DBili  x   /  AST  30  /  ALT  55<H>  /  AlkPhos  74  02-28    PT/INR - ( 28 Feb 2024 18:03 )   PT: 13.9 sec;   INR: 1.23          PTT - ( 28 Feb 2024 18:03 )  PTT:30.6 sec  Urinalysis Basic - ( 28 Feb 2024 18:03 )    Color: x / Appearance: x / SG: x / pH: x  Gluc: 114 mg/dL / Ketone: x  / Bili: x / Urobili: x   Blood: x / Protein: x / Nitrite: x   Leuk Esterase: x / RBC: x / WBC x   Sq Epi: x / Non Sq Epi: x / Bacteria: x    IMAGING:  < from: Xray Chest 2 Views PA/Lat (02.28.24 @ 19:32) >  IMPRESSION:    Lungs clear. No infiltrate pleural effusion or pneumothorax. Unremarkable   cardiac silhouette. No acute bone abnormality.    < end of copied text >

## 2024-02-29 NOTE — PHYSICAL THERAPY INITIAL EVALUATION ADULT - MODALITIES TREATMENT COMMENTS
End ROM L shoulder abduction>flexion pain note , Functional use and ROM/strength intact; sensation intact. + R side-bending and R rotation neck pain noted with radiation to L shoulder. OP PT and imaging recommended.

## 2024-03-01 ENCOUNTER — TRANSCRIPTION ENCOUNTER (OUTPATIENT)
Age: 54
End: 2024-03-01

## 2024-03-01 VITALS
OXYGEN SATURATION: 95 % | SYSTOLIC BLOOD PRESSURE: 154 MMHG | HEART RATE: 79 BPM | TEMPERATURE: 98 F | RESPIRATION RATE: 18 BRPM | DIASTOLIC BLOOD PRESSURE: 99 MMHG

## 2024-03-01 LAB
ALBUMIN SERPL ELPH-MCNC: 3.8 G/DL — SIGNIFICANT CHANGE UP (ref 3.3–5)
ALP SERPL-CCNC: 76 U/L — SIGNIFICANT CHANGE UP (ref 40–120)
ALT FLD-CCNC: 35 U/L — SIGNIFICANT CHANGE UP (ref 10–45)
ANION GAP SERPL CALC-SCNC: 10 MMOL/L — SIGNIFICANT CHANGE UP (ref 5–17)
AST SERPL-CCNC: 20 U/L — SIGNIFICANT CHANGE UP (ref 10–40)
BASOPHILS # BLD AUTO: 0.07 K/UL — SIGNIFICANT CHANGE UP (ref 0–0.2)
BASOPHILS NFR BLD AUTO: 1.1 % — SIGNIFICANT CHANGE UP (ref 0–2)
BILIRUB SERPL-MCNC: 0.2 MG/DL — SIGNIFICANT CHANGE UP (ref 0.2–1.2)
BUN SERPL-MCNC: 22 MG/DL — SIGNIFICANT CHANGE UP (ref 7–23)
CALCIUM SERPL-MCNC: 8.5 MG/DL — SIGNIFICANT CHANGE UP (ref 8.4–10.5)
CHLORIDE SERPL-SCNC: 103 MMOL/L — SIGNIFICANT CHANGE UP (ref 96–108)
CO2 SERPL-SCNC: 26 MMOL/L — SIGNIFICANT CHANGE UP (ref 22–31)
CREAT SERPL-MCNC: 0.67 MG/DL — SIGNIFICANT CHANGE UP (ref 0.5–1.3)
EGFR: 112 ML/MIN/1.73M2 — SIGNIFICANT CHANGE UP
EOSINOPHIL # BLD AUTO: 0.52 K/UL — HIGH (ref 0–0.5)
EOSINOPHIL NFR BLD AUTO: 8.1 % — HIGH (ref 0–6)
GLUCOSE SERPL-MCNC: 103 MG/DL — HIGH (ref 70–99)
HCT VFR BLD CALC: 39.2 % — SIGNIFICANT CHANGE UP (ref 39–50)
HGB BLD-MCNC: 12.7 G/DL — LOW (ref 13–17)
IMM GRANULOCYTES NFR BLD AUTO: 1.7 % — HIGH (ref 0–0.9)
LYMPHOCYTES # BLD AUTO: 2.51 K/UL — SIGNIFICANT CHANGE UP (ref 1–3.3)
LYMPHOCYTES # BLD AUTO: 39.1 % — SIGNIFICANT CHANGE UP (ref 13–44)
MAGNESIUM SERPL-MCNC: 2.2 MG/DL — SIGNIFICANT CHANGE UP (ref 1.6–2.6)
MCHC RBC-ENTMCNC: 28.2 PG — SIGNIFICANT CHANGE UP (ref 27–34)
MCHC RBC-ENTMCNC: 32.4 GM/DL — SIGNIFICANT CHANGE UP (ref 32–36)
MCV RBC AUTO: 86.9 FL — SIGNIFICANT CHANGE UP (ref 80–100)
MONOCYTES # BLD AUTO: 0.51 K/UL — SIGNIFICANT CHANGE UP (ref 0–0.9)
MONOCYTES NFR BLD AUTO: 7.9 % — SIGNIFICANT CHANGE UP (ref 2–14)
NEUTROPHILS # BLD AUTO: 2.7 K/UL — SIGNIFICANT CHANGE UP (ref 1.8–7.4)
NEUTROPHILS NFR BLD AUTO: 42.1 % — LOW (ref 43–77)
NRBC # BLD: 0 /100 WBCS — SIGNIFICANT CHANGE UP (ref 0–0)
PHOSPHATE SERPL-MCNC: 2.4 MG/DL — LOW (ref 2.5–4.5)
PLATELET # BLD AUTO: 313 K/UL — SIGNIFICANT CHANGE UP (ref 150–400)
POTASSIUM SERPL-MCNC: 3.9 MMOL/L — SIGNIFICANT CHANGE UP (ref 3.5–5.3)
POTASSIUM SERPL-SCNC: 3.9 MMOL/L — SIGNIFICANT CHANGE UP (ref 3.5–5.3)
PROT SERPL-MCNC: 7.6 G/DL — SIGNIFICANT CHANGE UP (ref 6–8.3)
RBC # BLD: 4.51 M/UL — SIGNIFICANT CHANGE UP (ref 4.2–5.8)
RBC # FLD: 14.2 % — SIGNIFICANT CHANGE UP (ref 10.3–14.5)
SODIUM SERPL-SCNC: 139 MMOL/L — SIGNIFICANT CHANGE UP (ref 135–145)
WBC # BLD: 6.42 K/UL — SIGNIFICANT CHANGE UP (ref 3.8–10.5)
WBC # FLD AUTO: 6.42 K/UL — SIGNIFICANT CHANGE UP (ref 3.8–10.5)

## 2024-03-01 PROCEDURE — 80053 COMPREHEN METABOLIC PANEL: CPT

## 2024-03-01 PROCEDURE — 85610 PROTHROMBIN TIME: CPT

## 2024-03-01 PROCEDURE — 83735 ASSAY OF MAGNESIUM: CPT

## 2024-03-01 PROCEDURE — 99239 HOSP IP/OBS DSCHRG MGMT >30: CPT | Mod: GC

## 2024-03-01 PROCEDURE — 93306 TTE W/DOPPLER COMPLETE: CPT

## 2024-03-01 PROCEDURE — 82962 GLUCOSE BLOOD TEST: CPT

## 2024-03-01 PROCEDURE — 99285 EMERGENCY DEPT VISIT HI MDM: CPT

## 2024-03-01 PROCEDURE — 83880 ASSAY OF NATRIURETIC PEPTIDE: CPT

## 2024-03-01 PROCEDURE — 36415 COLL VENOUS BLD VENIPUNCTURE: CPT

## 2024-03-01 PROCEDURE — 84484 ASSAY OF TROPONIN QUANT: CPT

## 2024-03-01 PROCEDURE — 71046 X-RAY EXAM CHEST 2 VIEWS: CPT

## 2024-03-01 PROCEDURE — 97161 PT EVAL LOW COMPLEX 20 MIN: CPT

## 2024-03-01 PROCEDURE — 85730 THROMBOPLASTIN TIME PARTIAL: CPT

## 2024-03-01 PROCEDURE — 84100 ASSAY OF PHOSPHORUS: CPT

## 2024-03-01 PROCEDURE — 85025 COMPLETE CBC W/AUTO DIFF WBC: CPT

## 2024-03-01 PROCEDURE — 73030 X-RAY EXAM OF SHOULDER: CPT

## 2024-03-01 RX ADMIN — ENOXAPARIN SODIUM 100 MILLIGRAM(S): 100 INJECTION SUBCUTANEOUS at 06:36

## 2024-03-01 RX ADMIN — LEVETIRACETAM 1000 MILLIGRAM(S): 250 TABLET, FILM COATED ORAL at 06:36

## 2024-03-01 RX ADMIN — Medication 400 MILLIGRAM(S): at 06:35

## 2024-03-01 NOTE — DISCHARGE NOTE NURSING/CASE MANAGEMENT/SOCIAL WORK - PATIENT PORTAL LINK FT
You can access the FollowMyHealth Patient Portal offered by [facility] by registering at the following website: [email or web address]. By joining [facility]’s FollowMyHealth portal, you will also be able to view your health information using other applications (apps) compatible with our system.

## 2024-03-01 NOTE — CONSULT NOTE ADULT - SUBJECTIVE AND OBJECTIVE BOX
Patient is a 53y old  Male who presents with a chief complaint of Presyncope (29 Feb 2024 11:22)      HPI:  53M PMHx epilepsy, HTN presnts with presyncopal episode from PCP office. Patient was recently admitted 1 week ago found to have provoked DVT/ PE- CT scan showing no R heart strain and was discharged on eliquis. Patient was complaint with medication and went to follow up with PCP regarding shoulder pain and at the end of his visit when he tried to stand he became dizzy - and hypotensive. He was brought into the ED where he felt better already and had no dizziness, shoulder pain or other symptoms. No loss of consciousness, or seizures recently. All other ROS negative.    ED Vitals: T98, HR 74 /64 SPO2 97 on RA  ED Labs: WBC 8 Hg 13 Plt 317 PT 13.9 INR 1.23 PTT 30 Na 141 K 4.3 Cl 102 Bicarb 25 AG 14 BUN 18 Cr 0.89 Calcium 9.2 Trop T 12 BNP <36   Imaging: none  Intervention: tegretol, lovenox 100, keppra 1500    (28 Feb 2024 21:40)    PAST MEDICAL & SURGICAL HISTORY:  Hypertension      Epilepsy      No significant past surgical history        MEDICATIONS  (STANDING):  carBAMazepine ER Capsule 400 milliGRAM(s) Oral every 12 hours  enoxaparin Injectable 100 milliGRAM(s) SubCutaneous every 12 hours  guaiFENesin  milliGRAM(s) Oral every 12 hours  levETIRAcetam 500 milliGRAM(s) Oral every 24 hours  levETIRAcetam 1000 milliGRAM(s) Oral every 12 hours    MEDICATIONS  (PRN):            FAMILY HISTORY:  FH: hypertension        CBC Full  -  ( 28 Feb 2024 18:03 )  WBC Count : 8.38 K/uL  RBC Count : 4.69 M/uL  Hemoglobin : 13.2 g/dL  Hematocrit : 41.4 %  Platelet Count - Automated : 317 K/uL  Mean Cell Volume : 88.3 fl  Mean Cell Hemoglobin : 28.1 pg  Mean Cell Hemoglobin Concentration : 31.9 gm/dL  Auto Neutrophil # : 5.97 K/uL  Auto Lymphocyte # : 1.60 K/uL  Auto Monocyte # : 0.36 K/uL  Auto Eosinophil # : 0.37 K/uL  Auto Basophil # : 0.00 K/uL  Auto Neutrophil % : 71.3 %  Auto Lymphocyte % : 19.1 %  Auto Monocyte % : 4.3 %  Auto Eosinophil % : 4.4 %  Auto Basophil % : 0.0 %      02-28    141  |  102  |  18  ----------------------------<  114<H>  4.3   |  25  |  0.89    Ca    9.2      28 Feb 2024 18:03    TPro  7.7  /  Alb  4.0  /  TBili  0.2  /  DBili  x   /  AST  30  /  ALT  55<H>  /  AlkPhos  74  02-28      Urinalysis Basic - ( 28 Feb 2024 18:03 )    Color: x / Appearance: x / SG: x / pH: x  Gluc: 114 mg/dL / Ketone: x  / Bili: x / Urobili: x   Blood: x / Protein: x / Nitrite: x   Leuk Esterase: x / RBC: x / WBC x   Sq Epi: x / Non Sq Epi: x / Bacteria: x        Radiology :     < from: Xray Chest 2 Views PA/Lat (02.28.24 @ 19:32) >  ACC: [id number] EXAM:  XR CHEST PA LAT 2V   ORDERED BY: JULES CHIRINOS     PROCEDURE DATE:  02/28/2024          INTERPRETATION:  Chest 2 views    HISTORY: Syncope recent pulmonary embolus    IMPRESSION:    Lungs clear. No infiltrate pleural effusion or pneumothorax. Unremarkable   cardiac silhouette. No acute bone abnormality.              Review of Systems : per HPI         Vital Signs Last 24 Hrs  T(C): 36.7 (01 Mar 2024 05:50), Max: 37.1 (01 Mar 2024 05:45)  T(F): 98.1 (01 Mar 2024 05:50), Max: 98.7 (01 Mar 2024 05:45)  HR: 79 (01 Mar 2024 05:50) (74 - 80)  BP: 154/99 (01 Mar 2024 05:50) (136/85 - 154/99)  BP(mean): --  RR: 18 (01 Mar 2024 05:50) (16 - 18)  SpO2: 95% (01 Mar 2024 05:50) (95% - 98%)    Parameters below as of 01 Mar 2024 05:50  Patient On (Oxygen Delivery Method): room air            Physical Exam :  obese 53 y o man lying comfortably in semi Sawyer's position , awake , alert , no new complaints , feels tired     Head : normocephalic , atraumatic    Eyes : PERRLA , EOMI , no nystagmus , sclera anicteric    ENT / FACE : neg nasal discharge , uvula midline , no oropharyngeal erythema / exudate    Neck : supple , negative JVD , negative carotid bruits , no thyromegaly    Chest : CTA bilaterally , neg wheeze / rhonchi / rales / crackles / egophany    Cardiovascular : regular rate and rhythm , neg murmurs / rubs / gallops    Abdomen : soft , non distended , non tender to palpation in all 4 quadrants ,  normal bowel sounds     Extremities :1 + LLE edema , mildly tender      Neurologic Exam :     Alert and oriented x 3        Motor Exam:        > 4/5 x 4 extremities        Sensation :         intact to light touch x 4 extremities     DTR :           biceps/brachioradialis : equal                            patella/ankle : equal          Gait :  not tested          PM&R Impression : admitted for presyncopal episode from PCP office    - currently functionally independent        Recommendations / Plan :       1) Physical / Occupational therapy focusing on therapeutic exercises , equipment evaluation , bed mobility/transfer out of bed evaluation , progressive ambulation with assistive devices prn .    2) Current disposition plan recommendation  :       - d/c home with no PT/OT needs

## 2024-03-01 NOTE — CONSULT NOTE ADULT - ASSESSMENT
I M    53 y o M PMHx epilepsy, HTN recent PE diagnosed presents with presyncopal episode from PCP office admitted for further management.     Problem/Plan - 1:  ·  Problem: Near syncope.   ·  Plan: Patient p/w episode of presyncope at PCP office. Recently discharged on Eliquis for PE (see below problem). Possible increased excretion of Eliquis due to CY induction from cabamazepine. Pt also noted to be hypotensive during episode, and had taken BP meds in AM prior to episode. Orthostatics negative in ED. EKG NSR, trops and BNP negative. No seizure-like activity during episode. Presyncope possibly 2/2 HTN medications vs. RHS 2/2 inadequate PE treatment, although low suspicion for RHS.   - continue to hold BP meds at this time  - Repeat orthostatics negative  - f/u TTE.    Problem/Plan - 2:  ·  Problem: Pulmonary embolism.   ·  Plan: Hx of PE diagnosed 1 week ago, BNP Trops negative and no RHS on CT scan at that time, discharged on Eliquis. Patient was compliant with eliquis, likely interaction of CY induction from cabamazepine and eliquis. Switched to Lovenox on presentation.   - f/u TTE  - c/w Lovenox 100mg subQ BID.    Problem/Plan - 3:  ·  Problem: Hypertension.   ·  Plan: Patient with a hx of HTN on home amlodipine & lisinopril. Per HIE, patient instructed to d/c amlodipine.   - hold home meds i/s/o hypotensive presyncopal episode.    Problem/Plan - 4:  ·  Problem: Epilepsy.   ·  Plan: Patient with hx of epilepsy. Last seizure was after recent admission to [facility] for likely URI, at the time AED were uptitrated.  - C/w home Carbamazepine 400mg q12h  - C/w Keppra 1000mg qD at 8am,1500 qD at 8pm.    Problem/Plan - 5:  ·  Problem: Prophylactic measure.   ·  Plan: F: None  E: Replete PRN  N: regular diet  DVT: Lovenox, full dose  Code: Full.  Dispo: [facility].

## 2024-03-07 DIAGNOSIS — Z86.718 PERSONAL HISTORY OF OTHER VENOUS THROMBOSIS AND EMBOLISM: ICD-10-CM

## 2024-03-07 DIAGNOSIS — R55 SYNCOPE AND COLLAPSE: ICD-10-CM

## 2024-03-07 DIAGNOSIS — Z79.01 LONG TERM (CURRENT) USE OF ANTICOAGULANTS: ICD-10-CM

## 2024-03-07 DIAGNOSIS — G40.909 EPILEPSY, UNSPECIFIED, NOT INTRACTABLE, WITHOUT STATUS EPILEPTICUS: ICD-10-CM

## 2024-03-07 DIAGNOSIS — Z86.711 PERSONAL HISTORY OF PULMONARY EMBOLISM: ICD-10-CM

## 2024-03-07 DIAGNOSIS — I10 ESSENTIAL (PRIMARY) HYPERTENSION: ICD-10-CM

## 2024-03-07 DIAGNOSIS — I95.1 ORTHOSTATIC HYPOTENSION: ICD-10-CM

## 2024-03-12 ENCOUNTER — APPOINTMENT (OUTPATIENT)
Dept: FAMILY MEDICINE | Facility: CLINIC | Age: 54
End: 2024-03-12
Payer: COMMERCIAL

## 2024-03-12 VITALS
BODY MASS INDEX: 31.81 KG/M2 | WEIGHT: 240 LBS | OXYGEN SATURATION: 97 % | HEART RATE: 80 BPM | HEIGHT: 73 IN | DIASTOLIC BLOOD PRESSURE: 88 MMHG | SYSTOLIC BLOOD PRESSURE: 136 MMHG | RESPIRATION RATE: 14 BRPM

## 2024-03-12 PROCEDURE — 99214 OFFICE O/P EST MOD 30 MIN: CPT

## 2024-03-12 NOTE — PLAN
[FreeTextEntry1] : PE- FU with Pulm, referral for Heme work up Epilepsy- continue care with Neuro Labs for Hospital visit and echo reviewed

## 2024-03-12 NOTE — PHYSICAL EXAM
[Coordination Grossly Intact] : coordination grossly intact [Normal Gait] : normal gait [No Focal Deficits] : no focal deficits [Normal] : affect was normal and insight and judgment were intact [de-identified] : LLE edema compared to rt

## 2024-03-12 NOTE — HISTORY OF PRESENT ILLNESS
[de-identified] : 53 year old male with history of Epilepsy, HTN presents for FU post hospitalization.  He was observed overnight and started on Lovenx and off Eliquis now.  He reports doing well on Lovenx and neck/shoulder pain has also resolved.  He is off Amlodipine and only taking Lisinopril today. He states his Lt leg swelling has significant decreased and he is able to walk for 1-2 miles every day now. Echo on 2/29/24 mild LVH

## 2024-03-20 ENCOUNTER — APPOINTMENT (OUTPATIENT)
Dept: PULMONOLOGY | Facility: CLINIC | Age: 54
End: 2024-03-20
Payer: COMMERCIAL

## 2024-03-20 VITALS
HEART RATE: 76 BPM | DIASTOLIC BLOOD PRESSURE: 82 MMHG | SYSTOLIC BLOOD PRESSURE: 120 MMHG | WEIGHT: 240 LBS | RESPIRATION RATE: 12 BRPM | OXYGEN SATURATION: 96 % | BODY MASS INDEX: 31.81 KG/M2 | HEIGHT: 73 IN

## 2024-03-20 PROCEDURE — 94729 DIFFUSING CAPACITY: CPT

## 2024-03-20 PROCEDURE — 94726 PLETHYSMOGRAPHY LUNG VOLUMES: CPT

## 2024-03-20 PROCEDURE — 94060 EVALUATION OF WHEEZING: CPT

## 2024-03-20 PROCEDURE — 99203 OFFICE O/P NEW LOW 30 MIN: CPT | Mod: 25

## 2024-03-20 NOTE — HISTORY OF PRESENT ILLNESS
[Never] : never [TextBox_4] : Patient is a 53-year-old morbidly obese male past medical history significant for hypertension diabetes seizure disorder history of recent left DVT and subsequent reviewed the patient was found to have a large pulmonary embolism with segmental and subsegmental extension with no right heart strain.  The patient was started on Lovenox and discharged home.  He presents today for pulmonary evaluation.  The patient states that his exercise tolerance has improved.  He denies fevers chills chest pain weight loss or hemoptysis.

## 2024-03-20 NOTE — REASON FOR VISIT
[Consultation] : a consultation [Sleep Apnea] : sleep apnea [Pulmonary Embolism] : pulmonary embolism

## 2024-03-20 NOTE — PHYSICAL EXAM
[No Acute Distress] : no acute distress [Normal Oropharynx] : normal oropharynx [III] : Mallampati Class: III [Normal Appearance] : normal appearance [No Neck Mass] : no neck mass [Normal Rate/Rhythm] : normal rate/rhythm [Normal S1, S2] : normal s1, s2 [No Resp Distress] : no resp distress [No Murmurs] : no murmurs [Clear to Auscultation Bilaterally] : clear to auscultation bilaterally [No Abnormalities] : no abnormalities [Benign] : benign [Normal Gait] : normal gait [No Clubbing] : no clubbing [No Cyanosis] : no cyanosis [No Edema] : no edema [FROM] : FROM [No Focal Deficits] : no focal deficits [Normal Color/ Pigmentation] : normal color/ pigmentation [Oriented x3] : oriented x3 [Normal Affect] : normal affect

## 2024-03-20 NOTE — ASSESSMENT
[FreeTextEntry1] : In summary the patient is a 53-year-old male past medical history significant for hypertension history of seizure disorder found to have large pulmonary emboli with subsegmental and segmental pulmonary emboli presents today for pulmonary consult. The patient physical exam is significant for Mallampati score of 3.  The patient underwent a pulmonary function test which revealed mild obstructive with severe restrictive lung disease.  A home sleep monitor has been ordered.  A prescription for Lovenox twice daily has been sent and the patient is instructed to follow-up in 1 month.

## 2024-03-26 RX ORDER — ENOXAPARIN SODIUM 100 MG/ML
100 INJECTION, SOLUTION SUBCUTANEOUS TWICE DAILY
Qty: 6 | Refills: 5 | Status: ACTIVE | COMMUNITY
Start: 2024-03-26 | End: 1900-01-01

## 2024-04-23 ENCOUNTER — APPOINTMENT (OUTPATIENT)
Dept: PULMONOLOGY | Facility: CLINIC | Age: 54
End: 2024-04-23
Payer: COMMERCIAL

## 2024-04-23 VITALS
HEART RATE: 77 BPM | OXYGEN SATURATION: 99 % | DIASTOLIC BLOOD PRESSURE: 82 MMHG | SYSTOLIC BLOOD PRESSURE: 138 MMHG | RESPIRATION RATE: 14 BRPM | TEMPERATURE: 98 F

## 2024-04-23 DIAGNOSIS — Z86.69 PERSONAL HISTORY OF OTHER DISEASES OF THE NERVOUS SYSTEM AND SENSE ORGANS: ICD-10-CM

## 2024-04-23 DIAGNOSIS — Z82.49 FAMILY HISTORY OF ISCHEMIC HEART DISEASE AND OTHER DISEASES OF THE CIRCULATORY SYSTEM: ICD-10-CM

## 2024-04-23 DIAGNOSIS — Z83.3 FAMILY HISTORY OF DIABETES MELLITUS: ICD-10-CM

## 2024-04-23 DIAGNOSIS — D68.9 COAGULATION DEFECT, UNSPECIFIED: ICD-10-CM

## 2024-04-23 DIAGNOSIS — G47.33 OBSTRUCTIVE SLEEP APNEA (ADULT) (PEDIATRIC): ICD-10-CM

## 2024-04-23 PROCEDURE — 99212 OFFICE O/P EST SF 10 MIN: CPT

## 2024-05-30 ENCOUNTER — APPOINTMENT (OUTPATIENT)
Dept: FAMILY MEDICINE | Facility: CLINIC | Age: 54
End: 2024-05-30
Payer: COMMERCIAL

## 2024-05-30 VITALS
RESPIRATION RATE: 14 BRPM | BODY MASS INDEX: 31.94 KG/M2 | TEMPERATURE: 97.3 F | OXYGEN SATURATION: 99 % | SYSTOLIC BLOOD PRESSURE: 154 MMHG | WEIGHT: 241 LBS | HEIGHT: 73 IN | DIASTOLIC BLOOD PRESSURE: 83 MMHG | HEART RATE: 76 BPM

## 2024-05-30 DIAGNOSIS — I10 ESSENTIAL (PRIMARY) HYPERTENSION: ICD-10-CM

## 2024-05-30 DIAGNOSIS — G40.909 EPILEPSY, UNSPECIFIED, NOT INTRACTABLE, W/OUT STATUS EPILEPTICUS: ICD-10-CM

## 2024-05-30 DIAGNOSIS — I26.99 OTHER PULMONARY EMBOLISM W/OUT ACUTE COR PULMONALE: ICD-10-CM

## 2024-05-30 DIAGNOSIS — J06.9 ACUTE UPPER RESPIRATORY INFECTION, UNSPECIFIED: ICD-10-CM

## 2024-05-30 PROCEDURE — 99214 OFFICE O/P EST MOD 30 MIN: CPT

## 2024-05-30 RX ORDER — FLUTICASONE PROPIONATE 50 UG/1
50 SPRAY, METERED NASAL DAILY
Qty: 3 | Refills: 3 | Status: ACTIVE | COMMUNITY
Start: 2024-05-30 | End: 1900-01-01

## 2024-05-30 NOTE — HISTORY OF PRESENT ILLNESS
[de-identified] : 53 year old male with history of Epilepsy, HTN, DVT/PE presents for FU.  He has seen Pulm and Robert and was told he needs to stay on Blood thinners.  He is on Lovenox bid but is trying to switch to oral.  Robert has given him the option to go on Warfarin.  He is feeling well since started on Lovenox but noted bruising.  Lt leg swelling has significantly dec and he will be going for another doppler next month.  He reports cough and congestion last week but started to feel better today.

## 2024-05-30 NOTE — PHYSICAL EXAM
[No Acute Distress] : no acute distress [EOMI] : extraocular movements intact [Supple] : supple [Soft] : abdomen soft [Non-distended] : non-distended [Non Tender] : non-tender [No Masses] : no abdominal mass palpated [No HSM] : no HSM [Normal] : affect was normal and insight and judgment were intact [de-identified] : subcutaneous hematoma

## 2024-05-30 NOTE — PLAN
[FreeTextEntry1] : PE/DVT- continue Lovenox, fu with Heme Epilepsy- continue Tegretol, fu with Neuro URI- Flonase, fluids HTN- Lisinopril 40mg daily

## 2024-06-06 ENCOUNTER — APPOINTMENT (OUTPATIENT)
Dept: HEART AND VASCULAR | Facility: CLINIC | Age: 54
End: 2024-06-06
Payer: COMMERCIAL

## 2024-06-06 PROCEDURE — 93971 EXTREMITY STUDY: CPT

## 2024-06-11 PROBLEM — Z83.3 FAMILY HISTORY OF DIABETES MELLITUS: Status: ACTIVE | Noted: 2024-02-15

## 2024-06-11 PROBLEM — Z82.49 FAMILY HISTORY OF CORONARY ARTERY DISEASE: Status: ACTIVE | Noted: 2024-02-15

## 2024-06-11 PROBLEM — Z86.69 HISTORY OF SEIZURE DISORDER: Status: RESOLVED | Noted: 2024-03-20 | Resolved: 2024-06-11

## 2024-06-11 NOTE — ASSESSMENT
[FreeTextEntry1] : In summary the patient is a 54-year-old obese male past medical history significant for hypertension, obstructive sleep apnea who presents today for pulmonary consult.  The patient's history and physical is consistent with obstructive sleep apnea.  The patient has a Mallampati score 3.  The patient also suffers from cough and shortness of breath consistent with poorly controlled asthma.  Pulmonary function test as well as a home sleep monitor have been ordered and the patient is instructed to follow-up in 1 month.

## 2024-06-11 NOTE — HISTORY OF PRESENT ILLNESS
[Never] : never [TextBox_4] : 54 year old male with history of Epilepsy, HTN, DVT/PE presents for FU. He is on Lovenox bid but is trying to switch to oral. Heme has given him the option to go on Warfarin. He is feeling well since started on Lovenox but noted bruising. Lt leg swelling has significantly dec and he will be going for another doppler next month.  Patient is referred today for evaluation secondary to PE and obstructive sleep apnea.

## 2024-08-22 ENCOUNTER — APPOINTMENT (OUTPATIENT)
Dept: FAMILY MEDICINE | Facility: CLINIC | Age: 54
End: 2024-08-22

## 2024-09-10 NOTE — DISCUSSION/SUMMARY
[EKG obtained to assist in diagnosis and management of assessed problem(s)] : EKG obtained to assist in diagnosis and management of assessed problem(s) [Other: ____] : [unfilled]

## 2024-12-24 ENCOUNTER — APPOINTMENT (OUTPATIENT)
Age: 54
End: 2024-12-24
Payer: COMMERCIAL

## 2024-12-24 VITALS
SYSTOLIC BLOOD PRESSURE: 146 MMHG | OXYGEN SATURATION: 99 % | HEIGHT: 73 IN | TEMPERATURE: 97.2 F | BODY MASS INDEX: 32.6 KG/M2 | HEART RATE: 79 BPM | DIASTOLIC BLOOD PRESSURE: 85 MMHG | WEIGHT: 246 LBS | RESPIRATION RATE: 14 BRPM

## 2024-12-24 VITALS — DIASTOLIC BLOOD PRESSURE: 88 MMHG | SYSTOLIC BLOOD PRESSURE: 126 MMHG

## 2024-12-24 DIAGNOSIS — E66.9 OBESITY, UNSPECIFIED: ICD-10-CM

## 2024-12-24 DIAGNOSIS — Z00.00 ENCOUNTER FOR GENERAL ADULT MEDICAL EXAMINATION W/OUT ABNORMAL FINDINGS: ICD-10-CM

## 2024-12-24 DIAGNOSIS — G47.33 OBSTRUCTIVE SLEEP APNEA (ADULT) (PEDIATRIC): ICD-10-CM

## 2024-12-24 DIAGNOSIS — I26.99 OTHER PULMONARY EMBOLISM W/OUT ACUTE COR PULMONALE: ICD-10-CM

## 2024-12-24 PROCEDURE — G2211 COMPLEX E/M VISIT ADD ON: CPT | Mod: NC

## 2024-12-24 PROCEDURE — 36415 COLL VENOUS BLD VENIPUNCTURE: CPT

## 2024-12-24 PROCEDURE — 99401 PREV MED CNSL INDIV APPRX 15: CPT | Mod: 25

## 2024-12-24 PROCEDURE — 99214 OFFICE O/P EST MOD 30 MIN: CPT

## 2024-12-25 LAB
ALBUMIN SERPL ELPH-MCNC: 4.5 G/DL
ALP BLD-CCNC: 75 U/L
ALT SERPL-CCNC: 21 U/L
ANION GAP SERPL CALC-SCNC: 14 MMOL/L
AST SERPL-CCNC: 22 U/L
BASOPHILS # BLD AUTO: 0.04 K/UL
BASOPHILS NFR BLD AUTO: 0.6 %
BILIRUB SERPL-MCNC: 0.2 MG/DL
BUN SERPL-MCNC: 25 MG/DL
CALCIUM SERPL-MCNC: 9 MG/DL
CHLORIDE SERPL-SCNC: 103 MMOL/L
CHOLEST SERPL-MCNC: 211 MG/DL
CO2 SERPL-SCNC: 25 MMOL/L
CREAT SERPL-MCNC: 0.69 MG/DL
EGFR: 110 ML/MIN/1.73M2
EOSINOPHIL # BLD AUTO: 0.09 K/UL
EOSINOPHIL NFR BLD AUTO: 1.3 %
ESTIMATED AVERAGE GLUCOSE: 117 MG/DL
GLUCOSE SERPL-MCNC: 90 MG/DL
HBA1C MFR BLD HPLC: 5.7 %
HCT VFR BLD CALC: 42 %
HDLC SERPL-MCNC: 35 MG/DL
HGB BLD-MCNC: 13.7 G/DL
IMM GRANULOCYTES NFR BLD AUTO: 1 %
LDLC SERPL CALC-MCNC: 111 MG/DL
LYMPHOCYTES # BLD AUTO: 2.6 K/UL
LYMPHOCYTES NFR BLD AUTO: 36.2 %
MAN DIFF?: NORMAL
MCHC RBC-ENTMCNC: 28.8 PG
MCHC RBC-ENTMCNC: 32.6 G/DL
MCV RBC AUTO: 88.2 FL
MONOCYTES # BLD AUTO: 0.52 K/UL
MONOCYTES NFR BLD AUTO: 7.2 %
NEUTROPHILS # BLD AUTO: 3.87 K/UL
NEUTROPHILS NFR BLD AUTO: 53.7 %
NONHDLC SERPL-MCNC: 176 MG/DL
PLATELET # BLD AUTO: 243 K/UL
POTASSIUM SERPL-SCNC: 4.3 MMOL/L
PROT SERPL-MCNC: 7.8 G/DL
PSA FREE FLD-MCNC: 57 %
PSA FREE SERPL-MCNC: 0.15 NG/ML
PSA SERPL-MCNC: 0.26 NG/ML
RBC # BLD: 4.76 M/UL
RBC # FLD: 14.6 %
SODIUM SERPL-SCNC: 142 MMOL/L
TRIGL SERPL-MCNC: 377 MG/DL
WBC # FLD AUTO: 7.19 K/UL

## 2025-01-15 ENCOUNTER — APPOINTMENT (OUTPATIENT)
Age: 55
End: 2025-01-15

## 2025-02-21 ENCOUNTER — APPOINTMENT (OUTPATIENT)
Age: 55
End: 2025-02-21
Payer: COMMERCIAL

## 2025-02-21 ENCOUNTER — NON-APPOINTMENT (OUTPATIENT)
Age: 55
End: 2025-02-21

## 2025-02-21 VITALS
DIASTOLIC BLOOD PRESSURE: 101 MMHG | HEART RATE: 77 BPM | SYSTOLIC BLOOD PRESSURE: 172 MMHG | OXYGEN SATURATION: 97 % | HEIGHT: 73 IN | RESPIRATION RATE: 14 BRPM | WEIGHT: 256 LBS | TEMPERATURE: 98.7 F | BODY MASS INDEX: 33.93 KG/M2

## 2025-02-21 DIAGNOSIS — I51.7 CARDIOMEGALY: ICD-10-CM

## 2025-02-21 DIAGNOSIS — E78.5 HYPERLIPIDEMIA, UNSPECIFIED: ICD-10-CM

## 2025-02-21 DIAGNOSIS — E66.9 OBESITY, UNSPECIFIED: ICD-10-CM

## 2025-02-21 DIAGNOSIS — I26.99 OTHER PULMONARY EMBOLISM W/OUT ACUTE COR PULMONALE: ICD-10-CM

## 2025-02-21 DIAGNOSIS — R55 SYNCOPE AND COLLAPSE: ICD-10-CM

## 2025-02-21 DIAGNOSIS — I10 ESSENTIAL (PRIMARY) HYPERTENSION: ICD-10-CM

## 2025-02-21 PROCEDURE — 93000 ELECTROCARDIOGRAM COMPLETE: CPT

## 2025-02-21 PROCEDURE — 99214 OFFICE O/P EST MOD 30 MIN: CPT | Mod: 25

## 2025-02-21 RX ORDER — AMLODIPINE BESYLATE 5 MG/1
5 TABLET ORAL
Qty: 90 | Refills: 1 | Status: ACTIVE | COMMUNITY
Start: 2025-02-21 | End: 1900-01-01

## 2025-03-24 ENCOUNTER — APPOINTMENT (OUTPATIENT)
Age: 55
End: 2025-03-24

## 2025-03-24 ENCOUNTER — APPOINTMENT (OUTPATIENT)
Age: 55
End: 2025-03-24
Payer: COMMERCIAL

## 2025-03-24 PROCEDURE — 93306 TTE W/DOPPLER COMPLETE: CPT

## 2025-03-31 ENCOUNTER — APPOINTMENT (OUTPATIENT)
Age: 55
End: 2025-03-31
Payer: COMMERCIAL

## 2025-03-31 VITALS
OXYGEN SATURATION: 99 % | BODY MASS INDEX: 31.68 KG/M2 | SYSTOLIC BLOOD PRESSURE: 146 MMHG | HEART RATE: 76 BPM | HEIGHT: 73 IN | RESPIRATION RATE: 15 BRPM | DIASTOLIC BLOOD PRESSURE: 71 MMHG | WEIGHT: 239 LBS

## 2025-03-31 DIAGNOSIS — G47.33 OBSTRUCTIVE SLEEP APNEA (ADULT) (PEDIATRIC): ICD-10-CM

## 2025-03-31 DIAGNOSIS — E66.9 OBESITY, UNSPECIFIED: ICD-10-CM

## 2025-03-31 DIAGNOSIS — R55 SYNCOPE AND COLLAPSE: ICD-10-CM

## 2025-03-31 DIAGNOSIS — I26.99 OTHER PULMONARY EMBOLISM W/OUT ACUTE COR PULMONALE: ICD-10-CM

## 2025-03-31 DIAGNOSIS — I51.7 CARDIOMEGALY: ICD-10-CM

## 2025-03-31 DIAGNOSIS — D68.9 COAGULATION DEFECT, UNSPECIFIED: ICD-10-CM

## 2025-03-31 PROCEDURE — 99214 OFFICE O/P EST MOD 30 MIN: CPT

## 2025-04-04 ENCOUNTER — NON-APPOINTMENT (OUTPATIENT)
Age: 55
End: 2025-04-04

## 2025-04-04 ENCOUNTER — APPOINTMENT (OUTPATIENT)
Age: 55
End: 2025-04-04
Payer: COMMERCIAL

## 2025-04-04 VITALS
WEIGHT: 239 LBS | TEMPERATURE: 97.5 F | DIASTOLIC BLOOD PRESSURE: 84 MMHG | OXYGEN SATURATION: 99 % | HEART RATE: 79 BPM | BODY MASS INDEX: 31.68 KG/M2 | SYSTOLIC BLOOD PRESSURE: 136 MMHG | RESPIRATION RATE: 15 BRPM | HEIGHT: 73 IN

## 2025-04-04 VITALS — DIASTOLIC BLOOD PRESSURE: 82 MMHG | SYSTOLIC BLOOD PRESSURE: 126 MMHG

## 2025-04-04 DIAGNOSIS — I10 ESSENTIAL (PRIMARY) HYPERTENSION: ICD-10-CM

## 2025-04-04 DIAGNOSIS — G40.909 EPILEPSY, UNSPECIFIED, NOT INTRACTABLE, W/OUT STATUS EPILEPTICUS: ICD-10-CM

## 2025-04-04 DIAGNOSIS — E78.5 HYPERLIPIDEMIA, UNSPECIFIED: ICD-10-CM

## 2025-04-04 DIAGNOSIS — J06.9 ACUTE UPPER RESPIRATORY INFECTION, UNSPECIFIED: ICD-10-CM

## 2025-04-04 PROCEDURE — 87804 INFLUENZA ASSAY W/OPTIC: CPT | Mod: QW

## 2025-04-04 PROCEDURE — 87880 STREP A ASSAY W/OPTIC: CPT | Mod: QW

## 2025-04-04 PROCEDURE — 99214 OFFICE O/P EST MOD 30 MIN: CPT

## 2025-04-04 PROCEDURE — G2211 COMPLEX E/M VISIT ADD ON: CPT | Mod: NC

## 2025-04-05 LAB
FLUAV SPEC QL CULT: NEGATIVE
FLUBV AG SPEC QL IA: NEGATIVE
RESP PATH DNA+RNA PNL NPH NAA+NON-PROBE: DETECTED
RV+EV RNA NPH QL NAA+NON-PROBE: DETECTED
S PYO AG SPEC QL IA: NEGATIVE
SARS-COV-2 RNA RESP QL NAA+PROBE: NOT DETECTED

## 2025-05-29 ENCOUNTER — APPOINTMENT (OUTPATIENT)
Age: 55
End: 2025-05-29
Payer: COMMERCIAL

## 2025-05-29 VITALS
TEMPERATURE: 97.8 F | BODY MASS INDEX: 31.14 KG/M2 | DIASTOLIC BLOOD PRESSURE: 85 MMHG | SYSTOLIC BLOOD PRESSURE: 137 MMHG | RESPIRATION RATE: 14 BRPM | HEART RATE: 80 BPM | OXYGEN SATURATION: 98 % | HEIGHT: 73 IN | WEIGHT: 235 LBS

## 2025-05-29 DIAGNOSIS — J01.00 ACUTE MAXILLARY SINUSITIS, UNSPECIFIED: ICD-10-CM

## 2025-05-29 DIAGNOSIS — D68.9 COAGULATION DEFECT, UNSPECIFIED: ICD-10-CM

## 2025-05-29 DIAGNOSIS — I83.90 ASYMPTOMATIC VARICOSE VEINS OF UNSPECIFIED LOWER EXTREMITY: ICD-10-CM

## 2025-05-29 DIAGNOSIS — I82.5Z2 CHRONIC EMBOLISM AND THROMBOSIS OF UNSPECIFIED DEEP VEINS OF LEFT DISTAL LOWER EXTREMITY: ICD-10-CM

## 2025-05-29 LAB
INR PPP: 2.4 RATIO
POCT-PROTHROMBIN TIME: 28.2 SECS

## 2025-05-29 PROCEDURE — 85610 PROTHROMBIN TIME: CPT | Mod: QW

## 2025-05-29 PROCEDURE — 99214 OFFICE O/P EST MOD 30 MIN: CPT

## 2025-05-29 PROCEDURE — G2211 COMPLEX E/M VISIT ADD ON: CPT | Mod: NC

## 2025-05-29 RX ORDER — AMOXICILLIN AND CLAVULANATE POTASSIUM 875; 125 MG/1; MG/1
875-125 TABLET, COATED ORAL
Qty: 14 | Refills: 0 | Status: ACTIVE | COMMUNITY
Start: 2025-05-29 | End: 1900-01-01

## 2025-05-29 RX ORDER — AZELASTINE 137 UG/1
137 SPRAY, METERED NASAL TWICE DAILY
Qty: 1 | Refills: 1 | Status: ACTIVE | COMMUNITY
Start: 2025-05-29 | End: 1900-01-01

## 2025-05-29 RX ORDER — METHYLPREDNISOLONE 4 MG/1
4 TABLET ORAL DAILY
Qty: 1 | Refills: 0 | Status: ACTIVE | COMMUNITY
Start: 2025-05-29 | End: 1900-01-01

## 2025-06-13 ENCOUNTER — APPOINTMENT (OUTPATIENT)
Dept: ULTRASOUND IMAGING | Facility: CLINIC | Age: 55
End: 2025-06-13
Payer: COMMERCIAL

## 2025-06-13 PROCEDURE — 93971 EXTREMITY STUDY: CPT | Mod: LT

## 2025-06-30 ENCOUNTER — APPOINTMENT (OUTPATIENT)
Age: 55
End: 2025-06-30

## 2025-07-23 NOTE — PHYSICAL THERAPY INITIAL EVALUATION ADULT - HEALTH SCREEN CRITERIA
Department of Anesthesiology  Postprocedure Note    Patient: Adolfo Kincaid  MRN: [id number]  YOB: 1963  Date of evaluation: 7/23/2025    Procedure Summary       Date: 07/23/25 Room / Location: [facility] 02 / [facility] ENDOSCOPY    Anesthesia Start: 0854 Anesthesia Stop: 0902    Procedure: ESOPHAGOGASTRODUODENOSCOPY (Upper GI Region) Diagnosis:       Chronic pancreatitis, unspecified pancreatitis type (HCC)      (Chronic pancreatitis, unspecified pancreatitis type (HCC) [K86.1])    Surgeons: Sonia Schuler MD Responsible Provider: Jorge Yoo MD    Anesthesia Type: MAC ASA Status: 3            Anesthesia Type: MAC    Sara Phase I: Sara Score: 10    Sara Phase II:      Anesthesia Post Evaluation    Patient location during evaluation: bedside (Endoscopy Unit)  Patient participation: complete - patient participated  Level of consciousness: sleepy but conscious  Pain score: 0  Airway patency: patent  Nausea & Vomiting: no nausea and no vomiting  Cardiovascular status: hemodynamically stable  Respiratory status: acceptable  Hydration status: stable  Comments: This patient remained on the stretcher.  The patient was handed off to the endoscopy nursing team.  All questions regarding pre-, intra-, and postoperative care were answered.  Multimodal analgesia pain management approach    No notable events documented.   yes

## 2025-08-06 ENCOUNTER — RX RENEWAL (OUTPATIENT)
Age: 55
End: 2025-08-06

## 2025-09-03 ENCOUNTER — RX RENEWAL (OUTPATIENT)
Age: 55
End: 2025-09-03

## 2025-09-08 ENCOUNTER — APPOINTMENT (OUTPATIENT)
Age: 55
End: 2025-09-08